# Patient Record
Sex: MALE | Race: WHITE | Employment: FULL TIME | URBAN - METROPOLITAN AREA
[De-identification: names, ages, dates, MRNs, and addresses within clinical notes are randomized per-mention and may not be internally consistent; named-entity substitution may affect disease eponyms.]

---

## 2023-05-03 ENCOUNTER — HOSPITAL ENCOUNTER (EMERGENCY)
Age: 33
Discharge: HOME | End: 2023-05-04
Payer: COMMERCIAL

## 2023-05-03 ENCOUNTER — APPOINTMENT (OUTPATIENT)
Dept: GENERAL RADIOLOGY | Age: 33
End: 2023-05-03
Payer: COMMERCIAL

## 2023-05-03 DIAGNOSIS — R07.9 CHEST PAIN, UNSPECIFIED TYPE: Primary | ICD-10-CM

## 2023-05-03 LAB
ALBUMIN SERPL-MCNC: 4.9 G/DL (ref 3.4–5)
ALBUMIN/GLOB SERPL: 1.6 {RATIO} (ref 1.1–2.2)
ALP SERPL-CCNC: 49 U/L (ref 40–129)
ALT SERPL-CCNC: 28 U/L (ref 10–40)
ANION GAP SERPL CALCULATED.3IONS-SCNC: 12 MMOL/L (ref 3–16)
AST SERPL-CCNC: 23 U/L (ref 15–37)
BASOPHILS # BLD: 0 K/UL (ref 0–0.2)
BASOPHILS NFR BLD: 0.3 %
BILIRUB SERPL-MCNC: 0.5 MG/DL (ref 0–1)
BUN SERPL-MCNC: 15 MG/DL (ref 7–20)
CALCIUM SERPL-MCNC: 9.8 MG/DL (ref 8.3–10.6)
CHLORIDE SERPL-SCNC: 98 MMOL/L (ref 99–110)
CO2 SERPL-SCNC: 28 MMOL/L (ref 21–32)
CREAT SERPL-MCNC: 1 MG/DL (ref 0.9–1.3)
DEPRECATED RDW RBC AUTO: 12.3 % (ref 12.4–15.4)
EOSINOPHIL # BLD: 0 K/UL (ref 0–0.6)
EOSINOPHIL NFR BLD: 0.4 %
GFR SERPLBLD CREATININE-BSD FMLA CKD-EPI: >60 ML/MIN/{1.73_M2}
GLUCOSE SERPL-MCNC: 106 MG/DL (ref 70–99)
HCT VFR BLD AUTO: 43.3 % (ref 40.5–52.5)
HGB BLD-MCNC: 15.3 G/DL (ref 13.5–17.5)
LYMPHOCYTES # BLD: 2 K/UL (ref 1–5.1)
LYMPHOCYTES NFR BLD: 32.6 %
MCH RBC QN AUTO: 31.6 PG (ref 26–34)
MCHC RBC AUTO-ENTMCNC: 35.4 G/DL (ref 31–36)
MCV RBC AUTO: 89.3 FL (ref 80–100)
MONOCYTES # BLD: 0.5 K/UL (ref 0–1.3)
MONOCYTES NFR BLD: 7.5 %
NEUTROPHILS # BLD: 3.6 K/UL (ref 1.7–7.7)
NEUTROPHILS NFR BLD: 59.2 %
PLATELET # BLD AUTO: 247 K/UL (ref 135–450)
PMV BLD AUTO: 8.7 FL (ref 5–10.5)
POTASSIUM SERPL-SCNC: 3.5 MMOL/L (ref 3.5–5.1)
PROT SERPL-MCNC: 7.9 G/DL (ref 6.4–8.2)
RBC # BLD AUTO: 4.84 M/UL (ref 4.2–5.9)
SODIUM SERPL-SCNC: 138 MMOL/L (ref 136–145)
TROPONIN, HIGH SENSITIVITY: 7 NG/L (ref 0–22)
WBC # BLD AUTO: 6.1 K/UL (ref 4–11)

## 2023-05-03 PROCEDURE — 71045 X-RAY EXAM CHEST 1 VIEW: CPT

## 2023-05-03 PROCEDURE — 85025 COMPLETE CBC W/AUTO DIFF WBC: CPT

## 2023-05-03 PROCEDURE — 80053 COMPREHEN METABOLIC PANEL: CPT

## 2023-05-03 PROCEDURE — 99285 EMERGENCY DEPT VISIT HI MDM: CPT

## 2023-05-03 PROCEDURE — 84484 ASSAY OF TROPONIN QUANT: CPT

## 2023-05-03 PROCEDURE — 93005 ELECTROCARDIOGRAM TRACING: CPT | Performed by: EMERGENCY MEDICINE

## 2023-05-03 ASSESSMENT — PAIN - FUNCTIONAL ASSESSMENT: PAIN_FUNCTIONAL_ASSESSMENT: NONE - DENIES PAIN

## 2023-05-04 VITALS
HEIGHT: 71 IN | HEART RATE: 52 BPM | DIASTOLIC BLOOD PRESSURE: 74 MMHG | BODY MASS INDEX: 25.9 KG/M2 | WEIGHT: 185 LBS | OXYGEN SATURATION: 100 % | RESPIRATION RATE: 18 BRPM | SYSTOLIC BLOOD PRESSURE: 124 MMHG | TEMPERATURE: 98.5 F

## 2023-05-04 LAB
EKG ATRIAL RATE: 65 BPM
EKG DIAGNOSIS: NORMAL
EKG P AXIS: 66 DEGREES
EKG P-R INTERVAL: 146 MS
EKG Q-T INTERVAL: 382 MS
EKG QRS DURATION: 92 MS
EKG QTC CALCULATION (BAZETT): 397 MS
EKG R AXIS: 61 DEGREES
EKG T AXIS: 63 DEGREES
EKG VENTRICULAR RATE: 65 BPM
TROPONIN, HIGH SENSITIVITY: 7 NG/L (ref 0–22)

## 2023-05-04 PROCEDURE — 93010 ELECTROCARDIOGRAM REPORT: CPT | Performed by: INTERNAL MEDICINE

## 2023-05-04 ASSESSMENT — PAIN - FUNCTIONAL ASSESSMENT: PAIN_FUNCTIONAL_ASSESSMENT: NONE - DENIES PAIN

## 2023-05-04 NOTE — ED PROVIDER NOTES
I was asked for an EKG interpretation. Otherwise, I did not evaluate the patient. I was available for consultation. EKG  The Ekg interpreted by me in the absence of a cardiologist shows. normal sinus rhythm with a rate of 65  Axis is   Normal  QTc is  normal  Intervals and Durations are unremarkable. No specific ST-T wave changes appreciated. No evidence of acute ischemia.    No prior EKG available for comparison       Mallory Pina MD  05/04/23 4303
identified. .    Chronic conditions affecting care:   None.   has no past medical history on file. Social Determinants:   None identified    Consults:   None    Reassessment:      Patient continues without significant complaints on reevaluation and vitals have remained stable. MDM/Disposition Considerations:   Briefly Nehemiah Chan presents for episode of chest pain, elevated heart rate and near syncope. CBC was without leukocytosis or anemia. CMP was unremarkable and troponin of 7. Patient was stable portable chest x-ray. EKG normal sinus rhythm without evidence for acute ischemic change. Repeat troponin remains less than 7. At this time patient with low risk factors for cardiac etiology. Will be discharged home with recommendations for close and continued outpatient PCP and cardiology follow-up. We have discussed return precautions otherwise and patient in agreement and comfortable at discharge. .    Critical Care Time:   0 Minutes of critical care time spent not including separately billable procedures. DDx:  I estimate there is LOW risk for ACUTE CORONARY SYNDROME, INTRACRANIAL HEMORRHAGE, MALIGNANT DYSRHYTHMIA or HYPERTENSION, PULMONARY EMBOLISM, SEPSIS, SUBARACHNOID HEMORRHAGE, SUBDURAL HEMATOMA, STROKE, or THORACIC AORTIC DISSECTION, thus I consider the discharge disposition reasonable. Nancy Huerta and I have also discussed returning to the Emergency Department immediately if new or worsening symptoms occur. We have discussed the symptoms which are most concerning (e.g., bloody sputum, fever, worsening pain or shortness of breath, vomiting, weakness) that necessitate immediate return. FINAL IMPRESSION  1. Chest pain, unspecified type      Patient referred to:   [facility] [id number]  [phone]    Schedule an appointment as soon as possible for a visit       [address]

## 2024-01-04 ENCOUNTER — OFFICE VISIT (OUTPATIENT)
Dept: URGENT CARE | Age: 34
End: 2024-01-04

## 2024-01-04 VITALS
HEART RATE: 69 BPM | BODY MASS INDEX: 25.8 KG/M2 | WEIGHT: 185 LBS | DIASTOLIC BLOOD PRESSURE: 80 MMHG | SYSTOLIC BLOOD PRESSURE: 124 MMHG | OXYGEN SATURATION: 98 % | TEMPERATURE: 98.6 F

## 2024-01-04 DIAGNOSIS — J06.9 VIRAL URI WITH COUGH: ICD-10-CM

## 2024-01-04 DIAGNOSIS — R09.81 CONGESTION OF NASAL SINUS: Primary | ICD-10-CM

## 2024-01-04 DIAGNOSIS — R05.9 COUGH, UNSPECIFIED TYPE: ICD-10-CM

## 2024-01-04 LAB
INFLUENZA VIRUS A RNA: NEGATIVE
INFLUENZA VIRUS B RNA: NEGATIVE
Lab: NORMAL
QC PASS/FAIL: NORMAL
SARS-COV-2 RDRP RESP QL NAA+PROBE: NEGATIVE

## 2024-01-04 RX ORDER — FLUTICASONE PROPIONATE 50 MCG
2 SPRAY, SUSPENSION (ML) NASAL DAILY
Qty: 48 G | Refills: 1 | Status: SHIPPED | OUTPATIENT
Start: 2024-01-04

## 2024-01-04 RX ORDER — BROMPHENIRAMINE MALEATE, PSEUDOEPHEDRINE HYDROCHLORIDE, AND DEXTROMETHORPHAN HYDROBROMIDE 2; 30; 10 MG/5ML; MG/5ML; MG/5ML
5 SYRUP ORAL 4 TIMES DAILY PRN
Qty: 120 ML | Refills: 0 | Status: SHIPPED | OUTPATIENT
Start: 2024-01-04

## 2024-01-04 ASSESSMENT — ENCOUNTER SYMPTOMS
SHORTNESS OF BREATH: 0
SINUS PRESSURE: 0
EYE DISCHARGE: 0
COUGH: 1
EYE PAIN: 0
EYE REDNESS: 0
ABDOMINAL PAIN: 0
DIARRHEA: 0
VOMITING: 0
SORE THROAT: 0
NAUSEA: 0

## 2024-01-04 NOTE — PATIENT INSTRUCTIONS
- Pt to drink lots of fluids  - Pt to take medication as prescribed  - Pt ok to take tylenol and ibuprofen as needed  - Pt to call if any symptoms worsen or follow up with PCP  - Pt to go to ER if have shortness of breath or chest pain  - pt to isolate until fever free for 24 hours without fever reducers

## 2024-01-04 NOTE — PROGRESS NOTES
Shady Huerta (: 1990) is a 33 y.o. male, New patient, here for evaluation of the following chief complaint(s):  Congestion (Pt complains of congestion, productive cough, fever, chills for 4 days. )      ASSESSMENT/PLAN:    ICD-10-CM    1. Congestion of nasal sinus  R09.81 POCT Influenza A/B DNA (Alere i)     POCT COVID-19 Rapid, NAAT      2. Cough, unspecified type  R05.9 POCT Influenza A/B DNA (Alere i)     POCT COVID-19 Rapid, NAAT      3. Viral URI with cough  J06.9 brompheniramine-pseudoephedrine-DM (BROMFED DM) 2-30-10 MG/5ML syrup     fluticasone (FLONASE) 50 MCG/ACT nasal spray          - Pt wanted to have a rapid covid and flu test. Pt is negative for covid, flu A and flu B. Low concern for strep pharyngitis, otitis media, bacterial pneumonia, bronchitis, sinusitis or sepsis.  - Pt to drink lots of fluids  - Pt to take medication as prescribed  - Pt ok to take tylenol and ibuprofen as needed  - Pt to call if any symptoms worsen or follow up with PCP  - Pt to go to ER if have shortness of breath or chest pain  - pt to isolate until fever free for 24 hours without fever reducers      Follow up with your PCP or return to the clinic in 5 days if symptoms persist or if symptoms worsen.    Reviewed AVS with treatment instructions and answered questions - pt expresses understanding and agreement with the discussed treatment plan and AVS instructions.      SUBJECTIVE/OBJECTIVE:  HPI:   33 y.o. male presents for complaint of 4 days ago he started with fever, chills and sweats.    Also admits symptoms of nasal congestion, cough and headache.  Denies symptoms of sore throat, ear pain, abdominal pain, vomiting or diarrhea.     Has taken dayquil and niquil at home. No known sick contacts.      History provided by:  Patient   used: No        VITAL SIGNS  Vitals:    24 1547   BP: 124/80   Pulse: 69   Temp: 98.6 °F (37 °C)   SpO2: 98%   Weight: 83.9 kg (185 lb)       Review of

## 2024-01-08 ENCOUNTER — OFFICE VISIT (OUTPATIENT)
Dept: URGENT CARE | Age: 34
End: 2024-01-08

## 2024-01-08 VITALS
BODY MASS INDEX: 25.06 KG/M2 | HEIGHT: 72 IN | TEMPERATURE: 98 F | WEIGHT: 185 LBS | HEART RATE: 73 BPM | SYSTOLIC BLOOD PRESSURE: 120 MMHG | OXYGEN SATURATION: 99 % | DIASTOLIC BLOOD PRESSURE: 76 MMHG

## 2024-01-08 DIAGNOSIS — J40 BRONCHITIS: Primary | ICD-10-CM

## 2024-01-08 RX ORDER — BENZONATATE 100 MG/1
100 CAPSULE ORAL 3 TIMES DAILY PRN
Qty: 30 CAPSULE | Refills: 0 | Status: SHIPPED | OUTPATIENT
Start: 2024-01-08 | End: 2024-01-18

## 2024-01-08 RX ORDER — METHYLPREDNISOLONE 4 MG/1
TABLET ORAL
Qty: 1 KIT | Refills: 0 | Status: SHIPPED | OUTPATIENT
Start: 2024-01-08 | End: 2024-01-14

## 2024-01-08 ASSESSMENT — ENCOUNTER SYMPTOMS
DIARRHEA: 0
VOMITING: 0
ABDOMINAL PAIN: 0
EYE DISCHARGE: 0
SINUS PRESSURE: 0
EYE PAIN: 0
SHORTNESS OF BREATH: 0
EYE REDNESS: 0
SORE THROAT: 0
COUGH: 1
NAUSEA: 0

## 2024-01-08 NOTE — PROGRESS NOTES
Shady Huerta (: 1990) is a 34 y.o. male, Established patient, here for evaluation of the following chief complaint(s):  Cough (Unresolved, seen here on )      ASSESSMENT/PLAN:    ICD-10-CM    1. Bronchitis  J40 methylPREDNISolone (MEDROL DOSEPACK) 4 MG tablet     benzonatate (TESSALON) 100 MG capsule          - Pt did not want any testing done. Low concern for strep pharyngitis, otitis media, bacterial pneumonia, sinusitis or sepsis.  - Pt to drink lots of fluids  - Pt to take medication as prescribed  - Pt ok to take tylenol as needed  - Pt to call if any symptoms worsen or follow up with PCP  - Pt to go to ER if have shortness of breath or chest pain      Follow up with your PCP or return to the clinic in 5 days if symptoms persist or if symptoms worsen.    Reviewed AVS with treatment instructions and answered questions - pt expresses understanding and agreement with the discussed treatment plan and AVS instructions.      SUBJECTIVE/OBJECTIVE:  HPI:   34 y.o. male presents for complaint of pt was seen here 4 days ago and was diagnosed with a viral URI and given flonase and cough meds and he had a flu and covid tests that were negative. Pt states he comes back in today because his cough has seemed to be getting worse.    Also admits symptoms of nasal congestion and cough.  Denies symptoms of fevers, body aches, chills, sweats, ear pain, sore throat, abdominal pain, vomiting or diarrhea.     Has taken the medication that was prescribed and it has not seemed to help. No known sick contacts.      History provided by:  Patient   used: No        VITAL SIGNS  Vitals:    24 0917   BP: 120/76   Pulse: 73   Temp: 98 °F (36.7 °C)   TempSrc: Oral   SpO2: 99%   Weight: 83.9 kg (185 lb)   Height: 1.829 m (6')       Review of Systems   Constitutional:  Positive for fatigue. Negative for chills and fever.   HENT:  Positive for congestion. Negative for ear discharge, ear pain,

## 2024-01-08 NOTE — PATIENT INSTRUCTIONS
- Pt to drink lots of fluids  - Pt to take medication as prescribed  - Pt ok to take tylenol as needed  - Pt to call if any symptoms worsen or follow up with PCP  - Pt to go to ER if have shortness of breath or chest pain

## 2024-01-29 ENCOUNTER — TELEPHONE (OUTPATIENT)
Dept: ORTHOPEDIC SURGERY | Age: 34
End: 2024-01-29

## 2024-02-01 ENCOUNTER — OFFICE VISIT (OUTPATIENT)
Dept: ORTHOPEDIC SURGERY | Age: 34
End: 2024-02-01
Payer: COMMERCIAL

## 2024-02-01 VITALS — WEIGHT: 184.97 LBS | HEIGHT: 72 IN | BODY MASS INDEX: 25.05 KG/M2

## 2024-02-01 DIAGNOSIS — M47.812 CERVICAL SPONDYLOSIS: ICD-10-CM

## 2024-02-01 DIAGNOSIS — M54.2 CERVICAL PAIN: Primary | ICD-10-CM

## 2024-02-01 PROCEDURE — 99203 OFFICE O/P NEW LOW 30 MIN: CPT | Performed by: ORTHOPAEDIC SURGERY

## 2024-02-01 NOTE — PROGRESS NOTES
New Patient: CERVICAL SPINE    Referring Provider:  No ref. provider found    CHIEF COMPLAINT:    Chief Complaint   Patient presents with    Neck Pain     NP Cervical       HISTORY OF PRESENT ILLNESS:   Mr. Shady Huerta is a pleasant 34 y.o. old male presents today for evaluation of neck pain that began after lifting about 5 years ago.  He rates his pain 8/10.  He has 7/10 low back pain.. He denies loss of fine motor control, lower extremity symptoms, gait abnormality and bowel or bladder dysfunction.      Current/Past Treatment:   Physical Therapy: Yes and continues to do exercises  Chiropractic: No  Injection: Cervical JEFF and RF both helpful  Medications: none      Past Medical History:   I reviewed past medical history which is noncontributory to his present illness  Past Surgical History:     Past Surgical History:   Procedure Laterality Date    WISDOM TOOTH EXTRACTION      WRIST SURGERY      3 different surgerys      Current Medications:     Current Outpatient Medications:     brompheniramine-pseudoephedrine-DM (BROMFED DM) 2-30-10 MG/5ML syrup, Take 5 mLs by mouth 4 times daily as needed for Cough, Disp: 120 mL, Rfl: 0    fluticasone (FLONASE) 50 MCG/ACT nasal spray, 2 sprays by Each Nostril route daily, Disp: 48 g, Rfl: 1    Multiple Vitamins-Minerals (THERAPEUTIC MULTIVITAMIN-MINERALS) tablet, Take 1 tablet by mouth daily Indications: otc, Disp: , Rfl:   Allergies:  Erythromycin  Social History:    reports that he has quit smoking. He has quit using smokeless tobacco. He reports current alcohol use of about 3.0 standard drinks of alcohol per week. He reports that he does not use drugs.  Family History:   Family History   Problem Relation Age of Onset    High Blood Pressure Mother     High Blood Pressure Father     Heart Disease Father        REVIEW OF SYSTEMS: Full ROS noted & scanned   CONSTITUTIONAL: Denies unexplained weight loss, fevers, chills or fatigue  NEUROLOGICAL: Denies unsteady gait

## 2024-02-09 ENCOUNTER — OFFICE VISIT (OUTPATIENT)
Dept: FAMILY MEDICINE CLINIC | Age: 34
End: 2024-02-09
Payer: COMMERCIAL

## 2024-02-09 VITALS
RESPIRATION RATE: 16 BRPM | HEIGHT: 72 IN | DIASTOLIC BLOOD PRESSURE: 70 MMHG | SYSTOLIC BLOOD PRESSURE: 108 MMHG | BODY MASS INDEX: 25.19 KG/M2 | OXYGEN SATURATION: 97 % | HEART RATE: 58 BPM | WEIGHT: 186 LBS

## 2024-02-09 DIAGNOSIS — Z13.1 ENCOUNTER FOR SCREENING EXAMINATION FOR IMPAIRED GLUCOSE REGULATION AND DIABETES MELLITUS: ICD-10-CM

## 2024-02-09 DIAGNOSIS — F41.1 GENERALIZED ANXIETY DISORDER: Primary | ICD-10-CM

## 2024-02-09 DIAGNOSIS — J02.9 SORE THROAT: ICD-10-CM

## 2024-02-09 DIAGNOSIS — F10.10 ALCOHOL ABUSE: ICD-10-CM

## 2024-02-09 DIAGNOSIS — J02.0 ACUTE STREPTOCOCCAL PHARYNGITIS: ICD-10-CM

## 2024-02-09 PROBLEM — H91.90 PROBLEMS WITH HEARING: Status: ACTIVE | Noted: 2024-02-09

## 2024-02-09 LAB
ALBUMIN SERPL-MCNC: 5.1 G/DL (ref 3.4–5)
ALBUMIN/GLOB SERPL: 2.6 {RATIO} (ref 1.1–2.2)
ALP SERPL-CCNC: 57 U/L (ref 40–129)
ALT SERPL-CCNC: 19 U/L (ref 10–40)
ANION GAP SERPL CALCULATED.3IONS-SCNC: 11 MMOL/L (ref 3–16)
AST SERPL-CCNC: 21 U/L (ref 15–37)
BASOPHILS # BLD: 0 K/UL (ref 0–0.2)
BASOPHILS NFR BLD: 0.5 %
BILIRUB SERPL-MCNC: 0.4 MG/DL (ref 0–1)
BUN SERPL-MCNC: 23 MG/DL (ref 7–20)
CALCIUM SERPL-MCNC: 9.5 MG/DL (ref 8.3–10.6)
CHLORIDE SERPL-SCNC: 96 MMOL/L (ref 99–110)
CO2 SERPL-SCNC: 30 MMOL/L (ref 21–32)
CREAT SERPL-MCNC: 1.5 MG/DL (ref 0.9–1.3)
DEPRECATED RDW RBC AUTO: 12.3 % (ref 12.4–15.4)
EOSINOPHIL # BLD: 0 K/UL (ref 0–0.6)
EOSINOPHIL NFR BLD: 0.9 %
GFR SERPLBLD CREATININE-BSD FMLA CKD-EPI: >60 ML/MIN/{1.73_M2}
GLUCOSE SERPL-MCNC: 87 MG/DL (ref 70–99)
HCT VFR BLD AUTO: 43.5 % (ref 40.5–52.5)
HGB BLD-MCNC: 15.9 G/DL (ref 13.5–17.5)
LYMPHOCYTES # BLD: 1 K/UL (ref 1–5.1)
LYMPHOCYTES NFR BLD: 25 %
MCH RBC QN AUTO: 32.6 PG (ref 26–34)
MCHC RBC AUTO-ENTMCNC: 36.6 G/DL (ref 31–36)
MCV RBC AUTO: 89.1 FL (ref 80–100)
MONOCYTES # BLD: 0.6 K/UL (ref 0–1.3)
MONOCYTES NFR BLD: 14.1 %
NEUTROPHILS # BLD: 2.5 K/UL (ref 1.7–7.7)
NEUTROPHILS NFR BLD: 59.5 %
PLATELET # BLD AUTO: 175 K/UL (ref 135–450)
PMV BLD AUTO: 10.3 FL (ref 5–10.5)
POTASSIUM SERPL-SCNC: 4.7 MMOL/L (ref 3.5–5.1)
PROT SERPL-MCNC: 7.1 G/DL (ref 6.4–8.2)
RBC # BLD AUTO: 4.89 M/UL (ref 4.2–5.9)
S PYO AG THROAT QL: POSITIVE
SODIUM SERPL-SCNC: 137 MMOL/L (ref 136–145)
TSH SERPL DL<=0.005 MIU/L-ACNC: 2.65 UIU/ML (ref 0.27–4.2)
WBC # BLD AUTO: 4.2 K/UL (ref 4–11)

## 2024-02-09 PROCEDURE — 36415 COLL VENOUS BLD VENIPUNCTURE: CPT

## 2024-02-09 PROCEDURE — 87880 STREP A ASSAY W/OPTIC: CPT

## 2024-02-09 PROCEDURE — 99204 OFFICE O/P NEW MOD 45 MIN: CPT

## 2024-02-09 RX ORDER — LORAZEPAM 0.5 MG/1
0.5 TABLET ORAL PRN
Qty: 2 TABLET | Refills: 0 | Status: SHIPPED | OUTPATIENT
Start: 2024-02-09 | End: 2024-02-23

## 2024-02-09 RX ORDER — AMOXICILLIN 500 MG/1
500 CAPSULE ORAL 2 TIMES DAILY
Qty: 14 CAPSULE | Refills: 0 | Status: SHIPPED | OUTPATIENT
Start: 2024-02-09 | End: 2024-02-16

## 2024-02-09 SDOH — ECONOMIC STABILITY: HOUSING INSECURITY
IN THE LAST 12 MONTHS, WAS THERE A TIME WHEN YOU DID NOT HAVE A STEADY PLACE TO SLEEP OR SLEPT IN A SHELTER (INCLUDING NOW)?: NO

## 2024-02-09 SDOH — ECONOMIC STABILITY: FOOD INSECURITY: WITHIN THE PAST 12 MONTHS, THE FOOD YOU BOUGHT JUST DIDN'T LAST AND YOU DIDN'T HAVE MONEY TO GET MORE.: NEVER TRUE

## 2024-02-09 SDOH — HEALTH STABILITY: PHYSICAL HEALTH: ON AVERAGE, HOW MANY MINUTES DO YOU ENGAGE IN EXERCISE AT THIS LEVEL?: 50 MIN

## 2024-02-09 SDOH — HEALTH STABILITY: PHYSICAL HEALTH: ON AVERAGE, HOW MANY DAYS PER WEEK DO YOU ENGAGE IN MODERATE TO STRENUOUS EXERCISE (LIKE A BRISK WALK)?: 6 DAYS

## 2024-02-09 SDOH — ECONOMIC STABILITY: FOOD INSECURITY: WITHIN THE PAST 12 MONTHS, YOU WORRIED THAT YOUR FOOD WOULD RUN OUT BEFORE YOU GOT MONEY TO BUY MORE.: NEVER TRUE

## 2024-02-09 SDOH — ECONOMIC STABILITY: INCOME INSECURITY: HOW HARD IS IT FOR YOU TO PAY FOR THE VERY BASICS LIKE FOOD, HOUSING, MEDICAL CARE, AND HEATING?: NOT HARD AT ALL

## 2024-02-09 ASSESSMENT — PATIENT HEALTH QUESTIONNAIRE - PHQ9
SUM OF ALL RESPONSES TO PHQ QUESTIONS 1-9: 0
3. TROUBLE FALLING OR STAYING ASLEEP: 0
6. FEELING BAD ABOUT YOURSELF - OR THAT YOU ARE A FAILURE OR HAVE LET YOURSELF OR YOUR FAMILY DOWN: 0
4. FEELING TIRED OR HAVING LITTLE ENERGY: 0
SUM OF ALL RESPONSES TO PHQ QUESTIONS 1-9: 0
9. THOUGHTS THAT YOU WOULD BE BETTER OFF DEAD, OR OF HURTING YOURSELF: 0
10. IF YOU CHECKED OFF ANY PROBLEMS, HOW DIFFICULT HAVE THESE PROBLEMS MADE IT FOR YOU TO DO YOUR WORK, TAKE CARE OF THINGS AT HOME, OR GET ALONG WITH OTHER PEOPLE: 0
7. TROUBLE CONCENTRATING ON THINGS, SUCH AS READING THE NEWSPAPER OR WATCHING TELEVISION: 0
2. FEELING DOWN, DEPRESSED OR HOPELESS: 0
1. LITTLE INTEREST OR PLEASURE IN DOING THINGS: 0
SUM OF ALL RESPONSES TO PHQ QUESTIONS 1-9: 0
SUM OF ALL RESPONSES TO PHQ9 QUESTIONS 1 & 2: 0
SUM OF ALL RESPONSES TO PHQ QUESTIONS 1-9: 0
5. POOR APPETITE OR OVEREATING: 0
8. MOVING OR SPEAKING SO SLOWLY THAT OTHER PEOPLE COULD HAVE NOTICED. OR THE OPPOSITE, BEING SO FIGETY OR RESTLESS THAT YOU HAVE BEEN MOVING AROUND A LOT MORE THAN USUAL: 0

## 2024-02-09 ASSESSMENT — ENCOUNTER SYMPTOMS
BLOOD IN STOOL: 0
COLOR CHANGE: 0
WHEEZING: 0
SORE THROAT: 0
ABDOMINAL PAIN: 0
SHORTNESS OF BREATH: 0
CONSTIPATION: 0
COUGH: 0
DIARRHEA: 0

## 2024-02-09 NOTE — PROGRESS NOTES
Shady Huerta (:  1990) is a 34 y.o. male,New patient, here for evaluation of the following chief complaint(s):  Establish Care (Pt is here to establish care, has not had a PCP in a while )         ASSESSMENT/PLAN:  1. Generalized anxiety disorder  -     CBC with Auto Differential; Future  -     Comprehensive Metabolic Panel; Future  -     TSH with Reflex; Future  -     LORazepam (ATIVAN) 0.5 MG tablet; Take 1 tablet by mouth as needed for Anxiety (20 to 30 minutes before flight) for up to 2 doses. Max Daily Amount: 1 mg, Disp-2 tablet, R-0Normal  - Patient has generalized anxiety disorder with flying.  Patient had an episode which he had a panic attack on the plane ultimately the plane had to be diverted and he was taken to an ER had a negative cardiac workup.  However ever since patient has had issues with flying.  Patient is unable to fly with out having serious anxiety.  Plan to use short dose of Ativan since patient has upcoming flight for work that he has to attend.  Patient was educated on benefits of psychology.  Was given referral to psychiatry to work on talk therapy as well as cognitive behavioral therapy to reduce his fear of flying so he would no longer need medication.  He is agreeable and understands plan at this time.  Did review O[facility]RS with concerns educated on medication and potential side effects of medication.  2. Alcohol abuse  -     TSH with Reflex; Future  - Patient previously had fairly moderate alcohol abuse drinking 2 to 3 glasses of bourbon nightly.  Patient has since stopped we will check thyroid level and labs  3. Sore throat  -     POCT rapid strep A  -Positive  4. Acute streptococcal pharyngitis  -     amoxicillin (AMOXIL) 500 MG capsule; Take 1 capsule by mouth 2 times daily for 7 days, Disp-14 capsule, R-0Normal  - Patient had recent strep exposure as daughter tested positive.  In office today patient has tested positive for strep is currently asymptomatic did

## 2024-02-10 ENCOUNTER — HOSPITAL ENCOUNTER (OUTPATIENT)
Dept: MRI IMAGING | Age: 34
Discharge: HOME | End: 2024-02-10
Attending: ORTHOPAEDIC SURGERY
Payer: COMMERCIAL

## 2024-02-10 DIAGNOSIS — M54.2 CERVICAL PAIN: ICD-10-CM

## 2024-02-10 DIAGNOSIS — M47.812 CERVICAL SPONDYLOSIS: ICD-10-CM

## 2024-02-10 LAB
EST. AVERAGE GLUCOSE BLD GHB EST-MCNC: 85.3 MG/DL
HBA1C MFR BLD: 4.6 %

## 2024-02-10 PROCEDURE — 72141 MRI NECK SPINE W/O DYE: CPT

## 2024-02-15 ENCOUNTER — OFFICE VISIT (OUTPATIENT)
Dept: ORTHOPEDIC SURGERY | Age: 34
End: 2024-02-15
Payer: COMMERCIAL

## 2024-02-15 VITALS — WEIGHT: 186.07 LBS | HEIGHT: 72 IN | BODY MASS INDEX: 25.2 KG/M2

## 2024-02-15 DIAGNOSIS — M47.812 CERVICAL SPONDYLOSIS: Primary | ICD-10-CM

## 2024-02-15 PROCEDURE — 99212 OFFICE O/P EST SF 10 MIN: CPT | Performed by: ORTHOPAEDIC SURGERY

## 2024-02-15 NOTE — PROGRESS NOTES
New Patient: CERVICAL SPINE    Referring Provider:  No ref. provider found    CHIEF COMPLAINT:    Chief Complaint   Patient presents with    Neck Pain     Cervical MRI Review       HISTORY OF PRESENT ILLNESS:   Mr. Shady Huerta returns today with persistent neck pain that began after lifting about 5 years ago.  He rates his pain 8/10.  He has 7/10 low back pain.. He denies loss of fine motor control, lower extremity symptoms, gait abnormality and bowel or bladder dysfunction.      Current/Past Treatment:   Physical Therapy: Yes and continues to do exercises  Chiropractic: No  Injection: Cervical JEFF and RF both helpful  Medications: none      Past Medical History:   I reviewed past medical history which is noncontributory to his present illness  Past Surgical History:     Past Surgical History:   Procedure Laterality Date    WISDOM TOOTH EXTRACTION      WRIST SURGERY      3 different surgerys      Current Medications:     Current Outpatient Medications:     LORazepam (ATIVAN) 0.5 MG tablet, Take 1 tablet by mouth as needed for Anxiety (20 to 30 minutes before flight) for up to 2 doses. Max Daily Amount: 1 mg, Disp: 2 tablet, Rfl: 0    amoxicillin (AMOXIL) 500 MG capsule, Take 1 capsule by mouth 2 times daily for 7 days, Disp: 14 capsule, Rfl: 0  Allergies:  Erythromycin  Social History:    reports that he has quit smoking. He has quit using smokeless tobacco. He reports that he does not currently use alcohol. He reports that he does not use drugs.  Family History:   Family History   Problem Relation Age of Onset    High Blood Pressure Mother     High Blood Pressure Father     Heart Disease Father        REVIEW OF SYSTEMS: Full ROS noted & scanned   CONSTITUTIONAL: Denies unexplained weight loss, fevers, chills or fatigue  NEUROLOGICAL: Denies unsteady gait or progressive weakness  MUSCULOSKELETAL: Denies joint swelling or redness  PSYCHOLOGICAL: Denies anxiety, depression   SKIN: Denies skin changes,

## 2024-02-26 DIAGNOSIS — M47.812 CERVICAL SPONDYLOSIS: Primary | ICD-10-CM

## 2024-02-26 DIAGNOSIS — M54.2 CERVICAL PAIN: ICD-10-CM

## 2024-04-09 DIAGNOSIS — F41.1 GENERALIZED ANXIETY DISORDER: Primary | ICD-10-CM

## 2024-04-09 RX ORDER — LORAZEPAM 0.5 MG/1
0.5 TABLET ORAL PRN
Qty: 4 TABLET | Refills: 0 | Status: SHIPPED | OUTPATIENT
Start: 2024-04-09 | End: 2024-05-09

## 2024-07-22 ENCOUNTER — OFFICE VISIT (OUTPATIENT)
Dept: ORTHOPEDIC SURGERY | Age: 34
End: 2024-07-22
Payer: COMMERCIAL

## 2024-07-22 VITALS — WEIGHT: 170 LBS | BODY MASS INDEX: 23.03 KG/M2 | HEIGHT: 72 IN

## 2024-07-22 DIAGNOSIS — M75.81 ROTATOR CUFF TENDONITIS, RIGHT: ICD-10-CM

## 2024-07-22 DIAGNOSIS — M25.511 RIGHT SHOULDER PAIN, UNSPECIFIED CHRONICITY: ICD-10-CM

## 2024-07-22 DIAGNOSIS — M75.51 SUBACROMIAL BURSITIS OF RIGHT SHOULDER JOINT: Primary | ICD-10-CM

## 2024-07-22 PROCEDURE — 20610 DRAIN/INJ JOINT/BURSA W/O US: CPT | Performed by: ORTHOPAEDIC SURGERY

## 2024-07-22 PROCEDURE — 99204 OFFICE O/P NEW MOD 45 MIN: CPT | Performed by: ORTHOPAEDIC SURGERY

## 2024-07-22 RX ORDER — ROPIVACAINE HYDROCHLORIDE 5 MG/ML
30 INJECTION, SOLUTION EPIDURAL; INFILTRATION; PERINEURAL ONCE
Status: COMPLETED | OUTPATIENT
Start: 2024-07-22 | End: 2024-07-22

## 2024-07-22 RX ORDER — TRIAMCINOLONE ACETONIDE 40 MG/ML
80 INJECTION, SUSPENSION INTRA-ARTICULAR; INTRAMUSCULAR ONCE
Status: COMPLETED | OUTPATIENT
Start: 2024-07-22 | End: 2024-07-22

## 2024-07-22 RX ORDER — MELOXICAM 15 MG/1
15 TABLET ORAL DAILY PRN
Qty: 30 TABLET | Refills: 0 | Status: SHIPPED | OUTPATIENT
Start: 2024-07-22

## 2024-07-22 RX ADMIN — TRIAMCINOLONE ACETONIDE 80 MG: 40 INJECTION, SUSPENSION INTRA-ARTICULAR; INTRAMUSCULAR at 09:39

## 2024-07-22 RX ADMIN — ROPIVACAINE HYDROCHLORIDE 30 ML: 5 INJECTION, SOLUTION EPIDURAL; INFILTRATION; PERINEURAL at 09:38

## 2024-07-22 NOTE — PROGRESS NOTES
ORTHOPAEDIC SURGERY H&P / CONSULTATION NOTE    Chief complaint:   Chief Complaint   Patient presents with    Shoulder Pain     NP R SHOULDER      History of present illness: The patient is a 34 y.o. male right hand dominant with subjective symptoms of right shoulder pain. The chief complaint is located at right shoulder lateral aspect/posterior lateral aspect. Duration of symptoms has been for on again off again for years-3-5. The severity of symptoms is rated at 3/10 pain but can be as high as 8/10 pain on intake form.  Patient denies trauma.  He states that sharp pain with abduction and can also have difficulty with weight lifting.  He denies it waking him up at night per se.  He does state pain with ADLs and was quite bothersome last week.  He denies consistent anti-inflammatories.  He sees pain management for cervical related neck pain and had undergone an ablation within the last year which she states has helped.  He denies injections otherwise around the shoulder.  He denies therapy    The patient has tried the below listed items prior to today's consultation for above listed chief complaint.     -   Over-the-counter anti-inflammatories/prescription medication anti-inflammatory.     -   Physical therapy / guided home exercise program -     -   Previous corticosteroid injections    Past medical history:    Past Medical History:   Diagnosis Date    Anxiety 2021    Had a massive anxiety attack on a plane in May 2023 where i had to be given oxygen and went to the ER upon landing    Headache 2021    Work in front of computer all day.        Past surgical history:    Past Surgical History:   Procedure Laterality Date    WISDOM TOOTH EXTRACTION      WRIST SURGERY      3 different surgerys         Allergies:    Allergies   Allergen Reactions    Erythromycin          Medications:   Current Outpatient Medications:     meloxicam (MOBIC) 15 MG tablet, Take 1 tablet by mouth daily as needed for Pain Take 1 tablet by mouth

## 2024-11-14 ENCOUNTER — OFFICE VISIT (OUTPATIENT)
Dept: ORTHOPEDIC SURGERY | Age: 34
End: 2024-11-14
Payer: COMMERCIAL

## 2024-11-14 VITALS — HEIGHT: 72 IN | WEIGHT: 169.97 LBS | BODY MASS INDEX: 23.02 KG/M2

## 2024-11-14 DIAGNOSIS — M47.812 CERVICAL SPONDYLOSIS: Primary | ICD-10-CM

## 2024-11-14 PROCEDURE — 99213 OFFICE O/P EST LOW 20 MIN: CPT | Performed by: ORTHOPAEDIC SURGERY

## 2024-11-14 NOTE — PROGRESS NOTES
New Patient: CERVICAL SPINE    Referring Provider:  No ref. provider found    CHIEF COMPLAINT:    Chief Complaint   Patient presents with    Follow-up     Ck Cervical       HISTORY OF PRESENT ILLNESS:   Mr. Shady Huerta returns today with persistent neck pain that began after lifting about 5 years ago.  He rates his pain 8/10.  He has 7/10 low back pain.. He denies loss of fine motor control, lower extremity symptoms, gait abnormality and bowel or bladder dysfunction.  Recent RF in the cervical spine was not helpful, but a injection in the right shoulder was    Current/Past Treatment:   Physical Therapy: Yes and continues to do exercises  Chiropractic: No  Injection: Cervical JEFF and RF   Medications: none      Past Medical History:   I reviewed past medical history which is noncontributory to his present illness  Past Surgical History:     Past Surgical History:   Procedure Laterality Date    WISDOM TOOTH EXTRACTION      WRIST SURGERY      3 different surgerys      Current Medications:     Current Outpatient Medications:     meloxicam (MOBIC) 15 MG tablet, Take 1 tablet by mouth daily as needed for Pain Take 1 tablet by mouth daily as needed for pain, Disp: 30 tablet, Rfl: 0  Allergies:  Erythromycin  Social History:    reports that he has quit smoking. He has quit using smokeless tobacco. He reports that he does not currently use alcohol. He reports that he does not use drugs.  Family History:   Family History   Problem Relation Age of Onset    High Blood Pressure Mother     High Blood Pressure Father     Heart Disease Father        REVIEW OF SYSTEMS: Full ROS noted & scanned   CONSTITUTIONAL: Denies unexplained weight loss, fevers, chills or fatigue  NEUROLOGICAL: Denies unsteady gait or progressive weakness  MUSCULOSKELETAL: Denies joint swelling or redness  PSYCHOLOGICAL: Denies anxiety, depression   SKIN: Denies skin changes, delayed healing, rash, itching   HEMATOLOGIC: Denies easy bleeding or

## 2025-01-02 ENCOUNTER — HOSPITAL ENCOUNTER (OUTPATIENT)
Dept: PHYSICAL THERAPY | Age: 35
Setting detail: THERAPIES SERIES
Discharge: HOME | End: 2025-01-02
Payer: COMMERCIAL

## 2025-01-02 DIAGNOSIS — G89.29 CHRONIC RIGHT SHOULDER PAIN: ICD-10-CM

## 2025-01-02 DIAGNOSIS — M25.511 CHRONIC RIGHT SHOULDER PAIN: ICD-10-CM

## 2025-01-02 DIAGNOSIS — R29.898 WEAKNESS OF RIGHT SHOULDER: ICD-10-CM

## 2025-01-02 DIAGNOSIS — M54.2 CERVICAL PAIN: Primary | ICD-10-CM

## 2025-01-02 PROCEDURE — 97110 THERAPEUTIC EXERCISES: CPT

## 2025-01-02 PROCEDURE — 97161 PT EVAL LOW COMPLEX 20 MIN: CPT

## 2025-01-02 PROCEDURE — 20560 NDL INSJ W/O NJX 1 OR 2 MUSC: CPT

## 2025-01-02 PROCEDURE — 97140 MANUAL THERAPY 1/> REGIONS: CPT

## 2025-01-02 NOTE — PLAN OF CARE
nathaniel reddy in.   [] Progressing: [] Met: [] Not Met: [] Adjusted  3. Patient will demonstrate increased Strength of RTC/delt to at least 4+/5 throughout without pain to allow for proper functional mobility to enable patient to return to lifting routine at gym.   [] Progressing: [] Met: [] Not Met: [] Adjusted  4. Patient will return to full sand volleyball participation without increased symptoms or restriction to enable patient to resume PLOF and active lifestyle.   [] Progressing: [] Met: [] Not Met: [] Adjusted      Overall Progression Towards Functional goals/ Treatment Progress Update:  [] Patient is progressing as expected towards functional goals listed.    [] Progression is slowed due to complexities/Impairments listed.  [] Progression has been slowed due to co-morbidities.  [x] Plan just implemented, too soon (<30days) to assess goals progression   [] Goals require adjustment due to lack of progress  [] Patient is not progressing as expected and requires additional follow up with physician  [] Other:     TREATMENT PLAN     Frequency/Duration: 1-2x/week for  12  weeks for the following treatment interventions:    Interventions:  [x] Therapeutic exercise including: strength training, ROM, including postural re-education.   [x] NMR activation and proprioception, including postural re-education.    [x] Manual therapy as indicated to include: PROM, Gr I-IV mobilizations, STM, Grade V Manipulation, and Dry Needling/IASTM  [x] Modalities as needed that may include: Electrical Stimulation  [x] Patient education on joint protection, postural re-education, activity modification, progression of HEP.         Plan: POC initiated as per evaluation    Electronically Signed by Grover Ruiz PT  Date: 01/02/2025     Note: Portions of this note have been templated and/or copied from initial evaluation, reassessments and prior notes for documentation efficiency.    Note: If patient does not return for scheduled/recommended 
16

## 2025-01-03 ENCOUNTER — HOSPITAL ENCOUNTER (OUTPATIENT)
Dept: PHYSICAL THERAPY | Age: 35
Setting detail: THERAPIES SERIES
Discharge: HOME | End: 2025-01-03
Payer: COMMERCIAL

## 2025-01-03 ENCOUNTER — OFFICE VISIT (OUTPATIENT)
Dept: FAMILY MEDICINE CLINIC | Age: 35
End: 2025-01-03
Payer: COMMERCIAL

## 2025-01-03 VITALS
SYSTOLIC BLOOD PRESSURE: 114 MMHG | DIASTOLIC BLOOD PRESSURE: 68 MMHG | HEART RATE: 48 BPM | WEIGHT: 185 LBS | HEIGHT: 72 IN | OXYGEN SATURATION: 99 % | BODY MASS INDEX: 25.06 KG/M2

## 2025-01-03 DIAGNOSIS — M25.572 ACUTE LEFT ANKLE PAIN: ICD-10-CM

## 2025-01-03 DIAGNOSIS — F41.1 GENERALIZED ANXIETY DISORDER: Primary | ICD-10-CM

## 2025-01-03 DIAGNOSIS — Z76.89 ENCOUNTER TO ESTABLISH CARE: ICD-10-CM

## 2025-01-03 DIAGNOSIS — R53.1 GENERALIZED WEAKNESS: ICD-10-CM

## 2025-01-03 DIAGNOSIS — M47.812 CERVICAL SPONDYLOSIS: ICD-10-CM

## 2025-01-03 DIAGNOSIS — R00.1 BRADYCARDIA: ICD-10-CM

## 2025-01-03 DIAGNOSIS — M25.562 ACUTE PAIN OF LEFT KNEE: Primary | ICD-10-CM

## 2025-01-03 PROCEDURE — 97140 MANUAL THERAPY 1/> REGIONS: CPT

## 2025-01-03 PROCEDURE — 97110 THERAPEUTIC EXERCISES: CPT

## 2025-01-03 PROCEDURE — 99213 OFFICE O/P EST LOW 20 MIN: CPT | Performed by: NURSE PRACTITIONER

## 2025-01-03 PROCEDURE — 97161 PT EVAL LOW COMPLEX 20 MIN: CPT

## 2025-01-03 SDOH — HEALTH STABILITY: PHYSICAL HEALTH: ON AVERAGE, HOW MANY DAYS PER WEEK DO YOU ENGAGE IN MODERATE TO STRENUOUS EXERCISE (LIKE A BRISK WALK)?: 4 DAYS

## 2025-01-03 SDOH — HEALTH STABILITY: PHYSICAL HEALTH: ON AVERAGE, HOW MANY MINUTES DO YOU ENGAGE IN EXERCISE AT THIS LEVEL?: 60 MIN

## 2025-01-03 ASSESSMENT — PATIENT HEALTH QUESTIONNAIRE - PHQ9
SUM OF ALL RESPONSES TO PHQ QUESTIONS 1-9: 0
SUM OF ALL RESPONSES TO PHQ QUESTIONS 1-9: 0
SUM OF ALL RESPONSES TO PHQ9 QUESTIONS 1 & 2: 0
SUM OF ALL RESPONSES TO PHQ QUESTIONS 1-9: 0
2. FEELING DOWN, DEPRESSED OR HOPELESS: NOT AT ALL
SUM OF ALL RESPONSES TO PHQ QUESTIONS 1-9: 0
1. LITTLE INTEREST OR PLEASURE IN DOING THINGS: NOT AT ALL

## 2025-01-03 ASSESSMENT — ENCOUNTER SYMPTOMS
CONSTIPATION: 0
DIARRHEA: 0
VOMITING: 0
COLOR CHANGE: 0
BLOOD IN STOOL: 0
SHORTNESS OF BREATH: 0
NAUSEA: 0

## 2025-01-03 NOTE — ASSESSMENT & PLAN NOTE
Discussed PRN use of medication prior to flying  Limiting any etoh and caffeine prior to flying   Maintaining hydration prior to and during all flights  Pt will notify office of need for refills as needed for upcoming flights

## 2025-01-03 NOTE — PROGRESS NOTES
Gastrointestinal:  Negative for blood in stool, constipation, diarrhea, nausea and vomiting.   Musculoskeletal:  Positive for arthralgias.   Skin:  Negative for color change.   Neurological:  Positive for numbness.        Prior injury and surgical intervention left hand resulting in numbness in between fingers full rom strength strong and equal bilaterally        OBJECTIVE:  Vitals:    01/03/25 0822   BP: 114/68   Site: Left Upper Arm   Position: Sitting   Pulse: (!) 48   SpO2: 99%   Weight: 83.9 kg (185 lb)   Height: 1.829 m (6')      Body mass index is 25.09 kg/m².   Physical Exam  Vitals and nursing note reviewed.   Constitutional:       Appearance: Normal appearance. He is normal weight.   HENT:      Head: Normocephalic and atraumatic.      Right Ear: Tympanic membrane, ear canal and external ear normal.      Left Ear: Tympanic membrane, ear canal and external ear normal.      Nose: Nose normal.      Mouth/Throat:      Mouth: Mucous membranes are moist.      Pharynx: Oropharynx is clear.   Cardiovascular:      Rate and Rhythm: Regular rhythm. Bradycardia present.      Pulses: Normal pulses.   Pulmonary:      Effort: Pulmonary effort is normal.      Breath sounds: Normal breath sounds.   Musculoskeletal:         General: Tenderness present. Normal range of motion.      Right shoulder: Tenderness present.      Cervical back: Normal range of motion and neck supple.      Left knee: Tenderness present.      Comments: Followed by ortho ? Impingement right shoulder  Tenderness left calf achilles and left MCL has appt with ortho for evaluation    Skin:     General: Skin is warm and dry.   Neurological:      General: No focal deficit present.      Mental Status: He is alert and oriented to person, place, and time. Mental status is at baseline.   Psychiatric:         Mood and Affect: Mood normal.         Behavior: Behavior normal.         Thought Content: Thought content normal.         Judgment: Judgment normal.

## 2025-01-03 NOTE — ASSESSMENT & PLAN NOTE
Labs will be drawn later when pt has not recently exercised   We will discuss labs in mychart and any recommendations when they are back  Continue relationship with ortho for evaluation of shoulder and leg concerns

## 2025-01-03 NOTE — PLAN OF CARE
[facility]- [facility] [address], [place] [address] office: [phone] fax: [phone]    Physical Therapy Initial Evaluation Certification      Dear Pietro Link DO,    We had the pleasure of evaluating the following patient for physical therapy services at [facility].  A summary of our findings can be found in the initial assessment below.  This includes our plan of care.  If you have any questions or concerns regarding these findings, please do not hesitate to contact me at the office phone number listed above.  Thank you for the referral.     Physician Signature:_______________________________Date:__________________  By signing above (or electronic signature), therapist’s plan is approved by physician       Physical Therapy: TREATMENT/PROGRESS NOTE   Patient: Shady Huerta (34 y.o. male)   Examination Date: 2025   :  1990 MRN: [id number]   Visit #: 1   Insurance Allowable Auth Needed   BCBS check auth []Yes    []No    Insurance: Payor: [place] BCBS / Plan: [place] H[place] / Product Type: *No Product type* /   Insurance ID: [id number] - ([place] [employer])  Secondary Insurance (if applicable):    Treatment Diagnosis:     ICD-10-CM    1. Cervical pain  M54.2       2. Chronic right shoulder pain  M25.511     G89.29       3. Weakness of right shoulder  R29.898          Medical Diagnosis:  Cervical spondylosis [M47.812]   Referring Physician: Pietro Link DO  PCP: No primary care provider on file.     Plan of care signed (Y/N):     Date of Patient follow up with Physician:      Progress Report/POC: EVAL today  Progress note due: 2025 (OR 10 visits /OR AUTH LIMITS, whichever is less)  POC update due: 2025                                              Medical History:  Comorbidities:  None  Relevant Medical History: bulging disc in neck

## 2025-01-03 NOTE — ASSESSMENT & PLAN NOTE
Heart rate in office 54   Discussed hydration limitation of caffeine to less than 200mg daily  If heart rate monitor shows rate less than 40 notify office of seek er help increase hydration

## 2025-01-07 ENCOUNTER — HOSPITAL ENCOUNTER (OUTPATIENT)
Dept: PHYSICAL THERAPY | Age: 35
Setting detail: THERAPIES SERIES
End: 2025-01-07
Payer: COMMERCIAL

## 2025-01-08 ENCOUNTER — HOSPITAL ENCOUNTER (OUTPATIENT)
Dept: PHYSICAL THERAPY | Age: 35
Setting detail: THERAPIES SERIES
Discharge: HOME | End: 2025-01-08
Payer: COMMERCIAL

## 2025-01-08 PROCEDURE — 20560 NDL INSJ W/O NJX 1 OR 2 MUSC: CPT

## 2025-01-08 PROCEDURE — 97140 MANUAL THERAPY 1/> REGIONS: CPT

## 2025-01-08 PROCEDURE — 97110 THERAPEUTIC EXERCISES: CPT

## 2025-01-08 NOTE — FLOWSHEET NOTE
Progressing: [] Met: [] Not Met: [] Adjusted  2. Patient will have a decrease in pain to <0/10 to facilitate improvement in movement, function, and ADLs as indicated by Functional Deficits.  [] Progressing: [] Met: [] Not Met: [] Adjusted    Long Term Goals: To be achieved in: 12 weeks  1. Disability index score of 21% or less for the LEFS to assist with reaching prior level of function with activities such as dressing and hygiene ADLs.  [] Progressing: [] Met: [] Not Met: [] Adjusted  2. Patient will demonstrate increased AROM of left knee/ankle to functional IR/ER within 2 levels of opposite without pain to allow for proper joint functioning to enable patient to tuck shirt in.   [] Progressing: [] Met: [] Not Met: [] Adjusted  3. Patient will demonstrate increased Strength of quad/glutemed to at least 4+/5 throughout without pain to allow for proper functional mobility to enable patient to return to lifting routine at gym.   [] Progressing: [] Met: [] Not Met: [] Adjusted  4. Patient will return to full sand volleyball participation without increased symptoms or restriction to enable patient to resume PLOF and active lifestyle.   [] Progressing: [] Met: [] Not Met: [] Adjusted      Overall Progression Towards Functional goals/ Treatment Progress Update:  [] Patient is progressing as expected towards functional goals listed.    [] Progression is slowed due to complexities/Impairments listed.  [] Progression has been slowed due to co-morbidities.  [x] Plan just implemented, too soon (<30days) to assess goals progression   [] Goals require adjustment due to lack of progress  [] Patient is not progressing as expected and requires additional follow up with physician  [] Other:     TREATMENT PLAN     Frequency/Duration: 1-2x/week for  12  weeks for the following treatment interventions:    Interventions:  [x] Therapeutic exercise including: strength training, ROM, including postural re-education.   [x] NMR activation

## 2025-01-09 ENCOUNTER — HOSPITAL ENCOUNTER (OUTPATIENT)
Dept: PHYSICAL THERAPY | Age: 35
Setting detail: THERAPIES SERIES
Discharge: HOME | End: 2025-01-09
Payer: COMMERCIAL

## 2025-01-09 ENCOUNTER — HOSPITAL ENCOUNTER (OUTPATIENT)
Dept: PHYSICAL THERAPY | Age: 35
Setting detail: THERAPIES SERIES
End: 2025-01-09
Payer: COMMERCIAL

## 2025-01-09 PROCEDURE — 97140 MANUAL THERAPY 1/> REGIONS: CPT

## 2025-01-09 PROCEDURE — 97110 THERAPEUTIC EXERCISES: CPT

## 2025-01-09 PROCEDURE — 20560 NDL INSJ W/O NJX 1 OR 2 MUSC: CPT

## 2025-01-09 NOTE — FLOWSHEET NOTE
[facility]- Outpatient Rehabilitation and Therapy [address], [place] [address] office: [phone] fax: [phone]    Physical Therapy: TREATMENT/PROGRESS NOTE   Patient: Shady Huerta (35 y.o. male)   Examination Date: 2025   :  1990 MRN: [id number]   Visit #: 2   Insurance Allowable Auth Needed   BCBS check auth []Yes    []No    Insurance: Pay[place] BCBS / Plan: [place] / Product Type: *No Product type* /   Insurance ID: [id number] - ([place] BCBS)  Secondary Insurance (if applicable):    Treatment Diagnosis:     ICD-10-CM    1. Cervical pain  M54.2       2. Chronic right shoulder pain  M25.511     G89.29       3. Weakness of right shoulder  R29.898          Medical Diagnosis:  Cervical spondylosis [M47.812]   Referring Physician: Luiz Monzon MD  PCP: Michelle Bocanegra APRN - CNP     Plan of care signed (Y/N):     Date of Patient follow up with Physician:      Progress Report/POC: EVAL today  Progress note due: 2025 (OR 10 visits /OR AUTH LIMITS, whichever is less)  POC update due: 2025                                              Medical History:  Comorbidities:  None  Relevant Medical History: bulging disc in neck                                         Precautions/ Contra-indications:           Latex allergy:  NO  Pacemaker:    NO  Contraindications for Manipulation: None    Preferred Language for Healthcare:   [x] English       [] other:    SUBJECTIVE EXAMINATION     Patient stated complaint: Pt reports sore after last session for a little bit from needling. Still has wrapping around from back of shoulder to front pain.      Neck pain into right shoulder, does not go down arm. Neck pain for 4 years had MRI showing bulging disc, had another last year showing no changes. Has had PT at [place] in the past, feels dry needling helped a lot. Is now having trouble lifting arm up overhead. Plays sand volleyball 1x a week,

## 2025-01-10 ENCOUNTER — LAB (OUTPATIENT)
Dept: FAMILY MEDICINE CLINIC | Age: 35
End: 2025-01-10
Payer: COMMERCIAL

## 2025-01-10 DIAGNOSIS — Z76.89 ENCOUNTER TO ESTABLISH CARE: ICD-10-CM

## 2025-01-10 LAB
ALBUMIN SERPL-MCNC: 4.8 G/DL (ref 3.4–5)
ALBUMIN/GLOB SERPL: 2 {RATIO} (ref 1.1–2.2)
ALP SERPL-CCNC: 46 U/L (ref 40–129)
ALT SERPL-CCNC: 50 U/L (ref 10–40)
ANION GAP SERPL CALCULATED.3IONS-SCNC: 12 MMOL/L (ref 3–16)
AST SERPL-CCNC: 41 U/L (ref 15–37)
BILIRUB SERPL-MCNC: 0.4 MG/DL (ref 0–1)
BUN SERPL-MCNC: 22 MG/DL (ref 7–20)
CALCIUM SERPL-MCNC: 9.8 MG/DL (ref 8.3–10.6)
CHLORIDE SERPL-SCNC: 102 MMOL/L (ref 99–110)
CHOLEST SERPL-MCNC: 190 MG/DL (ref 0–199)
CO2 SERPL-SCNC: 28 MMOL/L (ref 21–32)
CREAT SERPL-MCNC: 1 MG/DL (ref 0.9–1.3)
GFR SERPLBLD CREATININE-BSD FMLA CKD-EPI: >90 ML/MIN/{1.73_M2}
GLUCOSE SERPL-MCNC: 89 MG/DL (ref 70–99)
HDLC SERPL-MCNC: 50 MG/DL (ref 40–60)
LDLC SERPL CALC-MCNC: 123 MG/DL
POTASSIUM SERPL-SCNC: 4.8 MMOL/L (ref 3.5–5.1)
PROT SERPL-MCNC: 7.2 G/DL (ref 6.4–8.2)
SODIUM SERPL-SCNC: 142 MMOL/L (ref 136–145)
TRIGL SERPL-MCNC: 86 MG/DL (ref 0–150)
VLDLC SERPL CALC-MCNC: 17 MG/DL

## 2025-01-10 PROCEDURE — 36415 COLL VENOUS BLD VENIPUNCTURE: CPT | Performed by: NURSE PRACTITIONER

## 2025-01-14 ENCOUNTER — HOSPITAL ENCOUNTER (OUTPATIENT)
Dept: PHYSICAL THERAPY | Age: 35
Setting detail: THERAPIES SERIES
Discharge: HOME | End: 2025-01-14
Payer: COMMERCIAL

## 2025-01-14 ENCOUNTER — APPOINTMENT (OUTPATIENT)
Dept: PHYSICAL THERAPY | Age: 35
End: 2025-01-14
Payer: COMMERCIAL

## 2025-01-14 PROCEDURE — 20560 NDL INSJ W/O NJX 1 OR 2 MUSC: CPT

## 2025-01-14 PROCEDURE — 97140 MANUAL THERAPY 1/> REGIONS: CPT

## 2025-01-14 PROCEDURE — 97110 THERAPEUTIC EXERCISES: CPT

## 2025-01-14 NOTE — FLOWSHEET NOTE
[facility]- Outpatient Rehabilitation and Therapy [address], [place] [address] office: [phone] fax: [phone]    Physical Therapy: TREATMENT/PROGRESS NOTE   Patient: Shady Huerta (35 y.o. male)   Examination Date: 2025   :  1990 MRN: [id number]   Visit #: 3   Insurance Allowable Auth Needed   BCBS check auth []Yes    []No    Insurance: [place] / Plan: [place] / Product Type: *No Product type* /   Insurance ID: [id number] - ([place] BCBS)  Secondary Insurance (if applicable):    Treatment Diagnosis:     ICD-10-CM    1. Cervical pain  M54.2       2. Chronic right shoulder pain  M25.511     G89.29       3. Weakness of right shoulder  R29.898          Medical Diagnosis:  Cervical spondylosis [M47.812]   Referring Physician: Luiz Monzon MD  PCP: Michelle Bocanegra APRN - CNP     Plan of care signed (Y/N):     Date of Patient follow up with Physician:      Progress Report/POC: EVAL today  Progress note due: 2025 (OR 10 visits /OR AUTH LIMITS, whichever is less)  POC update due: 2025                                              Medical History:  Comorbidities:  None  Relevant Medical History: bulging disc in neck                                         Precautions/ Contra-indications:           Latex allergy:  NO  Pacemaker:    NO  Contraindications for Manipulation: None    Preferred Language for Healthcare:   [x] English       [] other:    SUBJECTIVE EXAMINATION     Patient stated complaint: Pt reports did some cupping a couple days ago and felt good. Still feels weak when he plays volleyball as game progresses.       Neck pain into right shoulder, does not go down arm. Neck pain for 4 years had MRI showing bulging disc, had another last year showing no changes. Has had PT at [place] in the past, feels dry needling helped a lot. Is now having trouble lifting arm up overhead. Plays sand volleyball 1x a week, lifts

## 2025-01-15 ENCOUNTER — HOSPITAL ENCOUNTER (OUTPATIENT)
Dept: PHYSICAL THERAPY | Age: 35
Setting detail: THERAPIES SERIES
Discharge: HOME | End: 2025-01-15
Payer: COMMERCIAL

## 2025-01-15 PROCEDURE — 97140 MANUAL THERAPY 1/> REGIONS: CPT

## 2025-01-15 PROCEDURE — 20560 NDL INSJ W/O NJX 1 OR 2 MUSC: CPT

## 2025-01-15 PROCEDURE — 97110 THERAPEUTIC EXERCISES: CPT

## 2025-01-15 NOTE — FLOWSHEET NOTE
weeks  1. Independent in HEP and progression per patient tolerance, in order to prevent re-injury.   [] Progressing: [] Met: [] Not Met: [] Adjusted  2. Patient will have a decrease in pain to <0/10 to facilitate improvement in movement, function, and ADLs as indicated by Functional Deficits.  [] Progressing: [] Met: [] Not Met: [] Adjusted    Long Term Goals: To be achieved in: 12 weeks  1. Disability index score of 21% or less for the LEFS to assist with reaching prior level of function with activities such as dressing and hygiene ADLs.  [] Progressing: [] Met: [] Not Met: [] Adjusted  2. Patient will demonstrate increased AROM of left knee/ankle to functional IR/ER within 2 levels of opposite without pain to allow for proper joint functioning to enable patient to tuck shirt in.   [] Progressing: [] Met: [] Not Met: [] Adjusted  3. Patient will demonstrate increased Strength of quad/glutemed to at least 4+/5 throughout without pain to allow for proper functional mobility to enable patient to return to lifting routine at gym.   [] Progressing: [] Met: [] Not Met: [] Adjusted  4. Patient will return to full sand volleyball participation without increased symptoms or restriction to enable patient to resume PLOF and active lifestyle.   [] Progressing: [] Met: [] Not Met: [] Adjusted      Overall Progression Towards Functional goals/ Treatment Progress Update:  [] Patient is progressing as expected towards functional goals listed.    [] Progression is slowed due to complexities/Impairments listed.  [] Progression has been slowed due to co-morbidities.  [x] Plan just implemented, too soon (<30days) to assess goals progression   [] Goals require adjustment due to lack of progress  [] Patient is not progressing as expected and requires additional follow up with physician  [] Other:     TREATMENT PLAN     Frequency/Duration: 1-2x/week for  12  weeks for the following treatment interventions:    Interventions:  [x]

## 2025-01-21 ENCOUNTER — APPOINTMENT (OUTPATIENT)
Dept: PHYSICAL THERAPY | Age: 35
End: 2025-01-21
Payer: COMMERCIAL

## 2025-01-21 ENCOUNTER — HOSPITAL ENCOUNTER (OUTPATIENT)
Dept: PHYSICAL THERAPY | Age: 35
Setting detail: THERAPIES SERIES
Discharge: HOME | End: 2025-01-21
Payer: COMMERCIAL

## 2025-01-21 PROCEDURE — 97140 MANUAL THERAPY 1/> REGIONS: CPT

## 2025-01-21 PROCEDURE — 97110 THERAPEUTIC EXERCISES: CPT

## 2025-01-21 PROCEDURE — 20560 NDL INSJ W/O NJX 1 OR 2 MUSC: CPT

## 2025-01-21 NOTE — FLOWSHEET NOTE
muscle(s).  [] (56370) Needle insertion(s) without injection; 3 or more muscle(s)  [] (59337) ATTENDED ESTIM. Application of a modality to 1 or more areas; electrical stimulation (manual), each 15 minutes. Attended electrical stimulation requires direct (1-on-1) contact with the patient by the qualified professional/qualified personnel in providing electrical stimulation manually through the use of probes or other devices.  [] (13317) UNATTENDED ESTIM. Electrical stimulation (unattended), to 1 or more areas for indication(s) other than wound care, as part of a therapy plan of care. (\[facility]\"" )      GOALS     Patient stated goal: Pain free lifting/volleyball  [] Progressing: [] Met: [] Not Met: [] Adjusted    Therapist goals for Patient:   Short Term Goals: To be achieved in: 2 weeks  1. Independent in HEP and progression per patient tolerance, in order to prevent re-injury.   [] Progressing: [] Met: [] Not Met: [] Adjusted  2. Patient will have a decrease in pain to <0/10 to facilitate improvement in movement, function, and ADLs as indicated by Functional Deficits.  [] Progressing: [] Met: [] Not Met: [] Adjusted    Long Term Goals: To be achieved in: 12 weeks  1. Disability index score of 17% or less for the Neck Disability Index to assist with reaching prior level of function with activities such as dressing and hygiene ADLs.  [] Progressing: [] Met: [] Not Met: [] Adjusted  2. Patient will demonstrate increased AROM of shoulder to functional IR/ER within 2 levels of opposite without pain to allow for proper joint functioning to enable patient to tuck shirt in.   [] Progressing: [] Met: [] Not Met: [] Adjusted  3. Patient will demonstrate increased Strength of RTC/delt to at least 4+/5 throughout without pain to allow for proper functional mobility to enable patient to return to lifting routine at gym.   [] Progressing: [] Met: [] Not Met: [] Adjusted  4. Patient will return to full sand volleyball participation

## 2025-01-22 ENCOUNTER — APPOINTMENT (OUTPATIENT)
Dept: PHYSICAL THERAPY | Age: 35
End: 2025-01-22
Payer: COMMERCIAL

## 2025-01-23 ENCOUNTER — HOSPITAL ENCOUNTER (OUTPATIENT)
Dept: PHYSICAL THERAPY | Age: 35
Setting detail: THERAPIES SERIES
Discharge: HOME | End: 2025-01-23
Payer: COMMERCIAL

## 2025-01-23 PROCEDURE — 97140 MANUAL THERAPY 1/> REGIONS: CPT

## 2025-01-23 PROCEDURE — 97110 THERAPEUTIC EXERCISES: CPT

## 2025-01-23 PROCEDURE — 20560 NDL INSJ W/O NJX 1 OR 2 MUSC: CPT

## 2025-01-23 NOTE — FLOWSHEET NOTE
created electronically.  Refer to [employer] access code: [email or web address]    ASSESSMENT   Today's Assessment: During therapy this date, patient required visual cueing, verbal cueing, tactile cueing, muscle facilitation, modification of technique, progression of exercises and program, and manual interventions for exercise progression, improving proper muscle recruitment and activation/motor control patterns, modulating pain, promoting relaxation, increasing ROM, reduce/eliminate soft tissue swelling/inflammation/restriction, and improving soft tissue extensibility.Patient will continue to benefit from ongoing evaluation and advanced clinical decision from a Physical Therapist to address and improve pain control, ROM, muscle strength, neuromuscular control, and endurance to safely return to PLOF, ADLs/IADLs, work/work related tasks, recreational activity, and advanced sport activity without symptoms or restrictions. Tolerated needling with soreness. Able to perform more therex this session without issue. Glute med continues to show weakness deficits.     Medical Necessity Documentation:  I certify that this patient meets the below criteria necessary for medical necessity for care and/or justification of therapy services:  The patient has functional impairments and/or activity limitations and would benefit from continued outpatient therapy services to address the deficits outlined in the patients goals    Patient requires continued skilled intervention: [x] Yes  [] No      CHARGE CAPTURE     CPT Code (TIMED) minutes # CPT Code (UNTIMED) #     Therex (77435)  15 1  EVAL:LOW (42709 - Typically 20 minutes face-to-face)     Neuromusc. Re-ed (07756)    Re-Eval (26845)     Manual (00417) 10 1  VASO (25797)     Ther. Act (62151)    Estim Unattended (52567)     Gait (71132)    Dry Needle 1-2 muscle (20560) 1    Estim Attended (46145)    Dry Needle 3+ muscle (20561)     Iontophoresis (27822)    [facility]. Traction (32627)     Ultrasound

## 2025-01-28 ENCOUNTER — APPOINTMENT (OUTPATIENT)
Dept: PHYSICAL THERAPY | Age: 35
End: 2025-01-28
Payer: COMMERCIAL

## 2025-01-28 ENCOUNTER — HOSPITAL ENCOUNTER (OUTPATIENT)
Dept: PHYSICAL THERAPY | Age: 35
Setting detail: THERAPIES SERIES
End: 2025-01-28
Payer: COMMERCIAL

## 2025-01-29 ENCOUNTER — HOSPITAL ENCOUNTER (OUTPATIENT)
Dept: PHYSICAL THERAPY | Age: 35
Setting detail: THERAPIES SERIES
End: 2025-01-29
Payer: COMMERCIAL

## 2025-02-05 ENCOUNTER — HOSPITAL ENCOUNTER (OUTPATIENT)
Dept: PHYSICAL THERAPY | Age: 35
Setting detail: THERAPIES SERIES
Discharge: HOME | End: 2025-02-05
Payer: COMMERCIAL

## 2025-02-05 PROCEDURE — 97110 THERAPEUTIC EXERCISES: CPT

## 2025-02-05 PROCEDURE — 97140 MANUAL THERAPY 1/> REGIONS: CPT

## 2025-02-05 PROCEDURE — 20560 NDL INSJ W/O NJX 1 OR 2 MUSC: CPT

## 2025-02-05 NOTE — FLOWSHEET NOTE
[facility]- Outpatient Rehabilitation and Therapy [address], [place] [address] office: [phone] fax: [phone]    Physical Therapy: TREATMENT/PROGRESS NOTE   Patient: Shady Huerta (35 y.o. male)   Examination Date: 2025   :  1990 MRN: [id number]   Visit #: 5   Insurance Allowable Auth Needed   90 []Yes    [x]No    [place] / Product Type: *No Product type* /   Insurance ID: [id number] - ([place] BCBS)  Secondary Insurance (if applicable):      Treatment Diagnosis:    Acute pain of left knee  M25.562        Acute left ankle pain  M25.572        Generalized weakness  R53.1       Medical Diagnosis:  Acute pain of left knee  M25.562            Referring Physician: Pietro Link DO  PCP: Michelle Bocanegra APRN - CNP     Plan of care signed (Y/N):     Date of Patient follow up with Physician:      Progress Report/POC: NO  Progress note due: 2025 (OR 10 visits /OR AUTH LIMITS, whichever is less)  POC update due: 2025                                              Medical History:  Comorbidities:  None  Relevant Medical History: bulging disc in neck                                         Precautions/ Contra-indications:          Latex allergy:  NO  Pacemaker:    NO  Contraindications for Manipulation: None    Preferred Language for Healthcare:   [x] English       [] other:    SUBJECTIVE EXAMINATION     Patient stated complaint: Pt reports noticing improvement in LE symptoms, feeling a little stronger and not as overused.       Pt reports he ran about 1,000 miles last year, big uptick from 300ish the year before. Was going to do marathon but got injured a few months ago. Left leg around knee and into achillies, no calf/foot/ankle pain.        OBJECTIVE EXAMINATION      Test used Initial score  2025   Pain Summary VAS 0-7 1-2   Functional questionnaire LEFS 42%    ROM

## 2025-02-06 ENCOUNTER — HOSPITAL ENCOUNTER (OUTPATIENT)
Dept: PHYSICAL THERAPY | Age: 35
Setting detail: THERAPIES SERIES
Discharge: HOME | End: 2025-02-06
Payer: COMMERCIAL

## 2025-02-06 PROCEDURE — 20560 NDL INSJ W/O NJX 1 OR 2 MUSC: CPT

## 2025-02-06 PROCEDURE — 97110 THERAPEUTIC EXERCISES: CPT

## 2025-02-06 PROCEDURE — 97140 MANUAL THERAPY 1/> REGIONS: CPT

## 2025-02-06 NOTE — FLOWSHEET NOTE
[facility]- Outpatient Rehabilitation and Therapy [address], [place] [address] office: [phone] fax: [phone]    Physical Therapy: TREATMENT/PROGRESS NOTE   Patient: Shady Huerta (35 y.o. male)   Examination Date: 2025   :  1990 MRN: [id number]   Visit #: 5   Insurance Allowable Auth Needed   BCBS check auth []Yes    []No    [place] / [place] / Product Type: *No Product type* /   Insurance ID: [id number] - ([place] BCBS)  Secondary Insurance (if applicable):    Treatment Diagnosis:     ICD-10-CM    1. Cervical pain  M54.2       2. Chronic right shoulder pain  M25.511     G89.29       3. Weakness of right shoulder  R29.898          Medical Diagnosis:  Cervical spondylosis [M47.812]   Referring Physician: Luiz Monzon MD  PCP: Michelle Bocanegra APRN - CNP     Plan of care signed (Y/N):     Date of Patient follow up with Physician:      Progress Report/POC: EVAL today  Progress note due: 2025 (OR 10 visits /OR AUTH LIMITS, whichever is less)  POC update due: 2025                                              Medical History:  Comorbidities:  None  Relevant Medical History: bulging disc in neck                                         Precautions/ Contra-indications:           Latex allergy:  NO  Pacemaker:    NO  Contraindications for Manipulation: None    Preferred Language for Healthcare:   [x] English       [] other:    SUBJECTIVE EXAMINATION     Patient stated complaint: Pt reports pain centralizing to lateral aspect of shoulder.      Neck pain into right shoulder, does not go down arm. Neck pain for 4 years had MRI showing bulging disc, had another last year showing no changes. Has had PT at [place] in the past, feels dry needling helped a lot. Is now having trouble lifting arm up overhead. Plays sand volleyball 1x a week, lifts 3 times a weeks. Avoids anything overhead with his lifting, can't do

## 2025-02-12 ENCOUNTER — HOSPITAL ENCOUNTER (OUTPATIENT)
Dept: PHYSICAL THERAPY | Age: 35
Setting detail: THERAPIES SERIES
Discharge: HOME | End: 2025-02-12
Payer: COMMERCIAL

## 2025-02-12 PROCEDURE — 20560 NDL INSJ W/O NJX 1 OR 2 MUSC: CPT

## 2025-02-12 PROCEDURE — 97110 THERAPEUTIC EXERCISES: CPT

## 2025-02-12 PROCEDURE — 97140 MANUAL THERAPY 1/> REGIONS: CPT

## 2025-02-12 NOTE — PROGRESS NOTES
[facility]- Outpatient Rehabilitation and Therapy [address], [place] [address] office: [phone] fax: [phone]    Physical Therapy: TREATMENT/PROGRESS NOTE   Patient: Shady Huerta (35 y.o. male)   Examination Date: 2025   :  1990 MRN: [id number]   Visit #: 6   Insurance Allowable Auth Needed   90 []Yes    [x]No    [place] / Product Type: *No Product type* /   Insurance ID: [id number] - ([place] BCBS)  Secondary Insurance (if applicable):      Treatment Diagnosis:    Acute pain of left knee  M25.562        Acute left ankle pain  M25.572        Generalized weakness  R53.1       Medical Diagnosis:  Acute pain of left knee  M25.562            Referring Physician: Pietro Link DO  PCP: Michelle Bocanegra APRN - CNP     Plan of care signed (Y/N):     Date of Patient follow up with Physician:      Progress Report/POC: YES, Date Range for this report: 1/3/2025 to 2025  Progress note due: 3/12/2025 (OR 10 visits /OR AUTH LIMITS, whichever is less)  POC update due: 2025                                              Medical History:  Comorbidities:  None  Relevant Medical History: bulging disc in neck                                         Precautions/ Contra-indications:          Latex allergy:  NO  Pacemaker:    NO  Contraindications for Manipulation: None    Preferred Language for Healthcare:   [x] English       [] other:    SUBJECTIVE EXAMINATION     Patient stated complaint: Pt reports leg feeling like it is definietely getting better, needling quad last time helped a lot.      Pt reports he ran about 1,000 miles last year, big uptick from 300ish the year before. Was going to do marathon but got injured a few months ago. Left leg around knee and into achillies, no calf/foot/ankle pain.        OBJECTIVE EXAMINATION      Test used Initial score  2025   Pain Summary VAS 0-7 0

## 2025-02-13 ENCOUNTER — HOSPITAL ENCOUNTER (OUTPATIENT)
Dept: PHYSICAL THERAPY | Age: 35
Setting detail: THERAPIES SERIES
Discharge: HOME | End: 2025-02-13
Payer: COMMERCIAL

## 2025-02-13 PROCEDURE — 97140 MANUAL THERAPY 1/> REGIONS: CPT

## 2025-02-13 PROCEDURE — 97110 THERAPEUTIC EXERCISES: CPT

## 2025-02-13 PROCEDURE — 20560 NDL INSJ W/O NJX 1 OR 2 MUSC: CPT

## 2025-02-13 NOTE — FLOWSHEET NOTE
needling but nothing that lasts more than a day or two. Typically does okay once arm is warmed up but hurts a lot when cold.      Medical Necessity Documentation:  I certify that this patient meets the below criteria necessary for medical necessity for care and/or justification of therapy services:  The patient has functional impairments and/or activity limitations and would benefit from continued outpatient therapy services to address the deficits outlined in the patients goals    Patient requires continued skilled intervention: [x] Yes  [] No      CHARGE CAPTURE     CPT Code (TIMED) minutes # CPT Code (UNTIMED) #     Therex (79008)  10 1  EVAL:LOW (29208 - Typically 20 minutes face-to-face)     Neuromusc. Re-ed (49026) 5   Re-Eval (09023)     Manual (92062) 10 1  VASO (99698)     Ther. Act (97359)    Estim Unattended (65381)     Gait (72721)    Dry Needle 1-2 muscle (85892) 1    Estim Attended (08688)    Dry Needle 3+ muscle (20561)     Iontophoresis (85913)    [facility]. Traction (37227)     Ultrasound (14176)    Group Therapy (21892)     Other:    Other:    Total Timed Code Tx Minutes 25 2  1     Total Treatment Minutes 35       Charge Justification:  [x] (76961) THERAPEUTIC EXERCISE - Provided verbal/tactile cueing for activities related to strengthening, flexibility, endurance, ROM performed to prevent loss of range of motion, maintain or improve muscular strength or increase flexibility, following either an injury or surgery.   [x] (12352) NEUROMUSCULAR RE-EDUCATION - Therapeutic procedure, 1 or more areas, each 15 minutes; neuromuscular reeducation of movement, balance, coordination, kinesthetic sense, posture, and/or proprioception for sitting and/or standing activities  [x] (27009) MANUAL THERAPY -  Manual therapy techniques, 1 or more regions, each 15 minutes (Mobilization/manipulation, manual lymphatic drainage, manual traction) for the purpose of modulating pain, promoting relaxation,  increasing ROM,

## 2025-02-17 ENCOUNTER — TELEPHONE (OUTPATIENT)
Dept: ORTHOPEDIC SURGERY | Age: 35
End: 2025-02-17

## 2025-02-17 ENCOUNTER — OFFICE VISIT (OUTPATIENT)
Dept: ORTHOPEDIC SURGERY | Age: 35
End: 2025-02-17
Payer: COMMERCIAL

## 2025-02-17 VITALS — HEIGHT: 72 IN | WEIGHT: 185 LBS | BODY MASS INDEX: 25.06 KG/M2

## 2025-02-17 DIAGNOSIS — M75.51 SUBACROMIAL BURSITIS OF RIGHT SHOULDER JOINT: ICD-10-CM

## 2025-02-17 DIAGNOSIS — M75.81 ROTATOR CUFF TENDONITIS, RIGHT: Primary | ICD-10-CM

## 2025-02-17 PROCEDURE — 99213 OFFICE O/P EST LOW 20 MIN: CPT | Performed by: ORTHOPAEDIC SURGERY

## 2025-02-17 NOTE — TELEPHONE ENCOUNTER
Attempted to call Pt to determine where he would be wanting to go for MRI, as there may be no in network options in the area. Did not answer, so LVM to call me back at [phone] to determine how he would like to proceed.     Once I get this answer, I can relay to Yanelis in pre-cert.

## 2025-02-17 NOTE — TELEPHONE ENCOUNTER
Pt called back, and relayed to him that per Yanelis in pre-cert there were only 2 locations that showed up as in network for his plan: both [facility]. 1 of those 2 locations is showing as closed, and the other location appears to be a lab and not an imaging facility.     Confirmed insurance information with him over the phone. He would rather proceed with an in-network facility. Let him know I would reach back out to pre-cert and let him know if I find out any other information. He is understanding of this.

## 2025-02-17 NOTE — PROGRESS NOTES
FOLLOW UP ORTHOPAEDIC NOTE    The patient follows up today for reevaluation of right shoulder.  Patient states 100% symptom relief for roughly 3 to 4 months and slowly started to decrease throughout the holidays in terms of his pain relief.  Previous Mobic prescription and physician directed/physical therapy has also not alleviated the patient's pain with return of lateral and slight anterior lateral based shoulder symptoms.. The patient states 5/10 pain.  Patient states limitations with ADLs    PE:  AAOx3  RR  Unlabored breathing  Skin warm and moist  Focused physical examination of the right shoulder  Positive Neer positive Hui 5/5 rotator cuff testing throughout albeit with pain with supraspinatus testing  Remainder of range of motion and neurovascular exam unchanged     Diagnosis Orders   1. Rotator cuff tendonitis, right  MRI SHOULDER RIGHT WO CONTRAST      2. Subacromial bursitis of right shoulder joint  MRI SHOULDER RIGHT WO CONTRAST        Assessment and plan: 35 male with continued subjective symptoms of right shoulder pain with known, correlating diagnosis of right shoulder recalcitrant subacromial bursitis/rotator cuff tendinitis.  -Time of 12 minutes was spent coordinating and discussing the clinical findings and diagnostic imaging results as they pertain to the patient's presenting subjective symptoms.  -I had a pleasant discussion with the patient today.  I reviewed with him consideration for continued conservative care however did advocate for advanced imaging at this time.  MRI has been ordered and he will follow-up thereafter  -OTC Tylenol/Aleve per bottle PRN discomfort  -Patient has been going to physical therapy for his neck as well as the home program I taught him.  He will hold on any formal physical therapy visits at this time  -Activity modifications to include low impact  -All questions answered to the patient's satisfaction and the patient expressed understanding and agreement with the  Last ordered 6/8/2020

## 2025-02-18 ENCOUNTER — TELEPHONE (OUTPATIENT)
Dept: ORTHOPEDIC SURGERY | Age: 35
End: 2025-02-18

## 2025-02-18 NOTE — TELEPHONE ENCOUNTER
Spoke with the Pt and let him know that those same 2 locations were still the only locations showing up when pre-cert re-ran his insurance information. Recommended he call his insurance and see if they had any other in network options for him that we were not finding for some reason. He will call back with what he finds out, and I let him know I would call him if I heard anything else.

## 2025-02-19 ENCOUNTER — TELEPHONE (OUTPATIENT)
Dept: ORTHOPEDIC SURGERY | Age: 35
End: 2025-02-19

## 2025-02-19 ENCOUNTER — APPOINTMENT (OUTPATIENT)
Dept: PHYSICAL THERAPY | Age: 35
End: 2025-02-19
Payer: COMMERCIAL

## 2025-02-20 ENCOUNTER — TELEPHONE (OUTPATIENT)
Dept: ORTHOPEDIC SURGERY | Age: 35
End: 2025-02-20

## 2025-02-20 ENCOUNTER — APPOINTMENT (OUTPATIENT)
Dept: PHYSICAL THERAPY | Age: 35
End: 2025-02-20
Payer: COMMERCIAL

## 2025-02-20 NOTE — TELEPHONE ENCOUNTER
Called patient to let them know that their MRI has been authorized. Also told them that they can call [phone] and schedule a f/u with Dr. Weinstein once they have MRI scheduled, leaving at least 2-3 days for our office to receive their results.

## 2025-02-26 ENCOUNTER — APPOINTMENT (OUTPATIENT)
Dept: PHYSICAL THERAPY | Age: 35
End: 2025-02-26
Payer: COMMERCIAL

## 2025-02-27 ENCOUNTER — APPOINTMENT (OUTPATIENT)
Dept: PHYSICAL THERAPY | Age: 35
End: 2025-02-27
Payer: COMMERCIAL

## 2025-03-07 ENCOUNTER — OFFICE VISIT (OUTPATIENT)
Dept: ORTHOPEDIC SURGERY | Age: 35
End: 2025-03-07

## 2025-03-07 ENCOUNTER — TELEPHONE (OUTPATIENT)
Dept: ORTHOPEDIC SURGERY | Age: 35
End: 2025-03-07

## 2025-03-07 ENCOUNTER — OFFICE VISIT (OUTPATIENT)
Dept: FAMILY MEDICINE CLINIC | Age: 35
End: 2025-03-07

## 2025-03-07 VITALS
SYSTOLIC BLOOD PRESSURE: 112 MMHG | HEIGHT: 72 IN | HEART RATE: 51 BPM | WEIGHT: 179.6 LBS | DIASTOLIC BLOOD PRESSURE: 68 MMHG | BODY MASS INDEX: 24.33 KG/M2 | OXYGEN SATURATION: 97 %

## 2025-03-07 VITALS — HEIGHT: 72 IN | WEIGHT: 179 LBS | BODY MASS INDEX: 24.24 KG/M2

## 2025-03-07 DIAGNOSIS — M75.81 ROTATOR CUFF TENDONITIS, RIGHT: ICD-10-CM

## 2025-03-07 DIAGNOSIS — M75.21 RIGHT BICIPITAL TENOSYNOVITIS: ICD-10-CM

## 2025-03-07 DIAGNOSIS — M75.51 SUBACROMIAL BURSITIS OF RIGHT SHOULDER JOINT: Primary | ICD-10-CM

## 2025-03-07 DIAGNOSIS — Z00.00 PREVENTATIVE HEALTH CARE: ICD-10-CM

## 2025-03-07 DIAGNOSIS — Z00.00 ANNUAL WELLNESS VISIT: Primary | ICD-10-CM

## 2025-03-07 LAB
ALBUMIN SERPL-MCNC: 4.7 G/DL (ref 3.4–5)
ALBUMIN/GLOB SERPL: 2 {RATIO} (ref 1.1–2.2)
ALP SERPL-CCNC: 51 U/L (ref 40–129)
ALT SERPL-CCNC: 28 U/L (ref 10–40)
ANION GAP SERPL CALCULATED.3IONS-SCNC: 10 MMOL/L (ref 3–16)
AST SERPL-CCNC: 25 U/L (ref 15–37)
BILIRUB SERPL-MCNC: 0.5 MG/DL (ref 0–1)
BUN SERPL-MCNC: 25 MG/DL (ref 7–20)
CALCIUM SERPL-MCNC: 9.7 MG/DL (ref 8.3–10.6)
CHLORIDE SERPL-SCNC: 103 MMOL/L (ref 99–110)
CO2 SERPL-SCNC: 28 MMOL/L (ref 21–32)
CREAT SERPL-MCNC: 1.1 MG/DL (ref 0.9–1.3)
GFR SERPLBLD CREATININE-BSD FMLA CKD-EPI: 89 ML/MIN/{1.73_M2}
GLUCOSE SERPL-MCNC: 99 MG/DL (ref 70–99)
POTASSIUM SERPL-SCNC: 4.9 MMOL/L (ref 3.5–5.1)
PROT SERPL-MCNC: 7 G/DL (ref 6.4–8.2)
SODIUM SERPL-SCNC: 141 MMOL/L (ref 136–145)

## 2025-03-07 RX ORDER — MELOXICAM 15 MG/1
15 TABLET ORAL DAILY PRN
Qty: 30 TABLET | Refills: 0 | Status: SHIPPED | OUTPATIENT
Start: 2025-03-07

## 2025-03-07 ASSESSMENT — ENCOUNTER SYMPTOMS
DIARRHEA: 0
COLOR CHANGE: 0
NAUSEA: 0
SHORTNESS OF BREATH: 0
SORE THROAT: 0
VOMITING: 0

## 2025-03-07 NOTE — TELEPHONE ENCOUNTER
Pt called with questions about surgery recovery timeline. He has a scheduled trip to [place] July 13-9th, and is hoping to be able to be restriction free at that point. Wanted to know if that was possible with doing surgery around 2nd week of April. If not, he would like to hold off on surgery until after this trip. Let him know I would discuss with Dr. Weinstein and get back to him.

## 2025-03-07 NOTE — PROGRESS NOTES
PROGRESS NOTE  Date of Service:  3/7/2025    SUBJECTIVE:  Patient ID: Shady Huerta is a 35 y.o. male    HPI: annual well visit for annual   No sob no cp  Mp n/v/d  Bm daily no blood in stool  No risky behaviors  Sleep rested in am  Exercise- 6-7 days cardio and wts  No skin concerns  No safety concerns  No anxiety concerns   Knee and shoulder pain MRI followed by orthopedics   No cervical pain at this time treatment with ablation was beneficial   Vision exam lasik no need for visual correction   Dental UTD   No concern for hearing loss   No concerns for hearing with headset.    Past Medical History:   Diagnosis Date    Anxiety 2021    Had a massive anxiety attack on a plane in May 2023 where i had to be given oxygen and went to the ER upon landing    Headache 2021    Work in front of computer all day.      Past Surgical History:   Procedure Laterality Date    WISDOM TOOTH EXTRACTION      WRIST SURGERY      3 different surgerys       Social History     Tobacco Use    Smoking status: Former    Smokeless tobacco: Former   Substance Use Topics    Alcohol use: Not Currently     Comment: few times a week      Family History   Problem Relation Age of Onset    High Blood Pressure Mother     High Blood Pressure Father     Heart Disease Father      Current Outpatient Medications on File Prior to Visit   Medication Sig Dispense Refill    meloxicam (MOBIC) 15 MG tablet Take 1 tablet by mouth daily as needed for Pain 30 tablet 0     No current facility-administered medications on file prior to visit.       Allergies   Allergen Reactions    Erythromycin         Review of Systems   Constitutional:  Negative for fatigue, fever and unexpected weight change.   HENT:  Positive for congestion. Negative for sore throat.    Respiratory:  Negative for shortness of breath.    Cardiovascular:  Negative for chest pain, palpitations and leg swelling.   Gastrointestinal:  Negative for diarrhea, nausea and vomiting.

## 2025-03-07 NOTE — TELEPHONE ENCOUNTER
Called Pt back to let him know Dr. Weinstein recommends holding off on surgery until after his July vacation. Will reach back out beginning of June to schedule him, if he has not called me before then.    Have him scheduled for follow up next week to discuss possible repeat steroid injection to try to get him through until July.

## 2025-03-07 NOTE — PROGRESS NOTES
Positive acromioclavicular joint edema/arthrosis     Diagnosis Orders   1. Subacromial bursitis of right shoulder joint  Breg Sling Shot Shoulder Sling    meloxicam (MOBIC) 15 MG tablet      2. Rotator cuff tendonitis, right  Breg Sling Shot Shoulder Sling    meloxicam (MOBIC) 15 MG tablet      3. Right bicipital tenosynovitis  Breg Sling Shot Shoulder Sling    meloxicam (MOBIC) 15 MG tablet        Assessment and plan: 35 male with continued subjective symptoms of right shoulder pain with known, correlating diagnosis of right shoulder recalcitrant subacromial bursitis/rotator cuff tendinitis/partial tear and bicipital tenosynovitis.  -Time of 16 minutes was spent coordinating and discussing the clinical findings and diagnostic imaging results as they pertain to the patient's presenting subjective symptoms.  -I had a pleasant discussion with the patient today and reviewed with him directly his advanced imaging findings which do correlate to his examination.  I reviewed with him consideration for conservative care with additional corticosteroid injections versus surgical treatment options and given the patient's duration of symptoms for greater than 3 to 5 years and with having failed conservative care he wishes to proceed with surgical intervention of right shoulder arthroscopic subacromial decompression, possible rotator cuff repair, and open subpectoral biceps tenodesis.  The patient understands the perioperative course and what will entail  --Currently the patient wishes to proceed with surgery given right shoulder symptoms from right shoulder injury as assessed by clinical exam and diagnostic imaging, which correlates to subjective symptoms.  Understanding the risks and benefits of nonoperative versus operative treatments, he wishes to pursue surgery.    Benefits of decreased pain and improved function were discussed with the patient as well as potential risks which included but were not limited to damage to

## 2025-03-08 ENCOUNTER — RESULTS FOLLOW-UP (OUTPATIENT)
Dept: FAMILY MEDICINE CLINIC | Age: 35
End: 2025-03-08

## 2025-03-13 ENCOUNTER — OFFICE VISIT (OUTPATIENT)
Dept: ORTHOPEDIC SURGERY | Age: 35
End: 2025-03-13

## 2025-03-13 VITALS — BODY MASS INDEX: 24.24 KG/M2 | HEIGHT: 72 IN | WEIGHT: 179 LBS

## 2025-03-13 DIAGNOSIS — M75.81 ROTATOR CUFF TENDONITIS, RIGHT: ICD-10-CM

## 2025-03-13 DIAGNOSIS — M75.21 RIGHT BICIPITAL TENOSYNOVITIS: ICD-10-CM

## 2025-03-13 DIAGNOSIS — M75.51 SUBACROMIAL BURSITIS OF RIGHT SHOULDER JOINT: Primary | ICD-10-CM

## 2025-03-13 RX ORDER — ROPIVACAINE HYDROCHLORIDE 5 MG/ML
4 INJECTION, SOLUTION EPIDURAL; INFILTRATION; PERINEURAL ONCE
Status: COMPLETED | OUTPATIENT
Start: 2025-03-13 | End: 2025-03-13

## 2025-03-13 RX ORDER — TRIAMCINOLONE ACETONIDE 40 MG/ML
40 INJECTION, SUSPENSION INTRA-ARTICULAR; INTRAMUSCULAR ONCE
Status: COMPLETED | OUTPATIENT
Start: 2025-03-13 | End: 2025-03-13

## 2025-03-13 RX ADMIN — ROPIVACAINE HYDROCHLORIDE 4 ML: 5 INJECTION, SOLUTION EPIDURAL; INFILTRATION; PERINEURAL at 08:32

## 2025-03-13 RX ADMIN — TRIAMCINOLONE ACETONIDE 40 MG: 40 INJECTION, SUSPENSION INTRA-ARTICULAR; INTRAMUSCULAR at 08:33

## 2025-03-13 NOTE — PROGRESS NOTES
FOLLOW UP ORTHOPAEDIC NOTE    The patient follows up today for reevaluation of right shoulder. The patient states 7/10 pain.  Previous discussions we reviewed working toward surgery for definitive management however he has a trip upcoming and July that he needs to be completely ready for.  While I reviewed with him likelihood of still being able to proceed he wishes and is most comfortable to postpone surgery and wishes for an additional corticosteroid injection    PE:  AAOx3  RR  Unlabored breathing  Skin warm and moist  Focused physical examination of the right shoulder  Positive Neer positive Hui, positive tenderness palpation biceps tendon.  Neurovascular exam unchanged     Diagnosis Orders   1. Subacromial bursitis of right shoulder joint  20605 - [place] DRAIN/INJECT INTERMEDIATE JOINT/BURSA    triamcinolone acetonide (KENALOG-40) injection 40 mg    ROPivacaine (NAROPIN) 0.5% injection 4 mL      2. Rotator cuff tendonitis, right  20605 - [place] DRAIN/INJECT INTERMEDIATE JOINT/BURSA    triamcinolone acetonide (KENALOG-40) injection 40 mg    ROPivacaine (NAROPIN) 0.5% injection 4 mL      3. Right bicipital tenosynovitis          Assessment and plan: 35 male with continued subjective symptoms of right shoulder pain with known, correlating diagnosis of right shoulder subacromial bursitis/rotator cuff tendinitis and bicipital tenosynovitis.  -Time of 12 I had a pleasant discussion with the patient today.  Minutes was spent coordinating and discussing the clinical findings and diagnostic imaging results as they pertain to the patient's presenting subjective symptoms.  -I had a pleasant discussion with the patient today.  I reviewed with the patient consideration for corticosteroid injection in the subacromial space given the great excellent extremity from the previous.  He wishes to pursue this.  Understand the risk and benefits of a subacromial space injection was agreed upon  --The patient was educated to the risks

## 2025-06-11 ENCOUNTER — TELEPHONE (OUTPATIENT)
Dept: ORTHOPEDIC SURGERY | Age: 35
End: 2025-06-11

## 2025-06-11 NOTE — TELEPHONE ENCOUNTER
Spoke with Pt to discuss possible surgery scheduling. He now may not be able to proceed with surgery until September or later. Would like to discuss with Dr. Weinstein. Got him scheduled for follow up this Friday (6/13/2025). He will call if he needs anything else before then.    Tranexamic Acid Pregnancy And Lactation Text: It is unknown if this medication is safe during pregnancy or breast feeding. Skin Substitute Text: The defect edges were debeveled with a #15 scalpel blade.  Given the location of the defect, shape of the defect and the proximity to free margins a skin substitute graft was deemed most appropriate.  The graft material was trimmed to fit the size of the defect. The graft was then placed in the primary defect and oriented appropriately.

## 2025-06-13 ENCOUNTER — OFFICE VISIT (OUTPATIENT)
Dept: ORTHOPEDIC SURGERY | Age: 35
End: 2025-06-13

## 2025-06-13 VITALS — BODY MASS INDEX: 24.24 KG/M2 | HEIGHT: 72 IN | WEIGHT: 179 LBS

## 2025-06-13 DIAGNOSIS — M75.51 SUBACROMIAL BURSITIS OF RIGHT SHOULDER JOINT: ICD-10-CM

## 2025-06-13 DIAGNOSIS — M75.21 RIGHT BICIPITAL TENOSYNOVITIS: Primary | ICD-10-CM

## 2025-06-13 RX ORDER — MELOXICAM 15 MG/1
15 TABLET ORAL DAILY PRN
Qty: 30 TABLET | Refills: 0 | Status: SHIPPED | OUTPATIENT
Start: 2025-06-13

## 2025-06-13 RX ORDER — ROPIVACAINE HYDROCHLORIDE 5 MG/ML
2 INJECTION, SOLUTION EPIDURAL; INFILTRATION; PERINEURAL ONCE
Status: COMPLETED | OUTPATIENT
Start: 2025-06-13 | End: 2025-06-13

## 2025-06-13 RX ORDER — TRIAMCINOLONE ACETONIDE 40 MG/ML
40 INJECTION, SUSPENSION INTRA-ARTICULAR; INTRAMUSCULAR ONCE
Status: COMPLETED | OUTPATIENT
Start: 2025-06-13 | End: 2025-06-13

## 2025-06-13 RX ADMIN — TRIAMCINOLONE ACETONIDE 40 MG: 40 INJECTION, SUSPENSION INTRA-ARTICULAR; INTRAMUSCULAR at 11:57

## 2025-06-13 RX ADMIN — ROPIVACAINE HYDROCHLORIDE 2 ML: 5 INJECTION, SOLUTION EPIDURAL; INFILTRATION; PERINEURAL at 11:57

## 2025-06-13 NOTE — PROGRESS NOTES
blanching to name a few) and benefits of the injection and understanding these risks and benefits, the patient wished to proceed and a verbal consent was obtained.  If the patient verbalized the presence of diabetes or rheumatologic condition on chronic immunosuppresseants as a comorbidity upon direct questioning, an additional discussion was had detailing the potential increased risk of infection and potential increase in FSBG and to monitor FSBG and adjust medications as needed.  -After sterile prep of the right shoulder, a 2cc corticosteroid injection (2cc ropivicaine and 1cc kenolog 40) was administered into the right shoulder biceps tendon groove area.  The patient tolerated the procedure well and started to have immediate improvement in pain/symptoms.  -Continue activity modification as previously reviewed and discussed  -OTC Tylenol and Mobic 15 mg p.o. daily as needed pain  -All questions answered to the patient's satisfaction and the patient expressed understanding and agreement with the above listed treatment plan  -Follow up in 10 to 12 days postop or sooner should symptoms require  -Thank you for the clinical consultation and allowing me to participate in the patient's care.      Electronically signed by Harrison Weinstein MD on 6/13/25 at 12:36 PM EDT         Harrison Weinstein MD       Orthopaedic Surgery-Sports Medicine    Disclaimer:  This note was dictated with voice recognition software.  Though review and correction are routinely performed, please contact the office/medical records for any errors requiring correction.

## 2025-06-20 ENCOUNTER — TELEPHONE (OUTPATIENT)
Dept: ORTHOPEDIC SURGERY | Age: 35
End: 2025-06-20

## 2025-06-20 DIAGNOSIS — F40.243 FEAR OF FLYING: Primary | ICD-10-CM

## 2025-06-20 RX ORDER — LORAZEPAM 0.5 MG/1
0.5 TABLET ORAL 3 TIMES DAILY PRN
Qty: 6 TABLET | Refills: 0 | Status: SHIPPED | OUTPATIENT
Start: 2025-06-20 | End: 2025-06-30

## 2025-06-20 NOTE — TELEPHONE ENCOUNTER
Pt called and picked surgery date (10/1/2025)- providing him with location, estimated surgery time, and estimated arrival time. Scheduled him for first post op appointment- providing him with date, time, and location. Reviewed surgery folder forms. Pt does request that I send dates and times to him via [employer] message as well. Pt requests that I do let him know once I confirm that everything will be in network with his insurance company. Let him know I would reach out once I get confirmation.    He will reach back out if he needs anything else from us.

## 2025-06-25 ENCOUNTER — PREP FOR PROCEDURE (OUTPATIENT)
Dept: ORTHOPEDIC SURGERY | Age: 35
End: 2025-06-25

## 2025-06-25 DIAGNOSIS — M75.51 SUBACROMIAL BURSITIS OF RIGHT SHOULDER JOINT: ICD-10-CM

## 2025-06-25 DIAGNOSIS — M75.21 RIGHT BICIPITAL TENOSYNOVITIS: ICD-10-CM

## 2025-06-25 DIAGNOSIS — M75.81 ROTATOR CUFF TENDONITIS, RIGHT: ICD-10-CM

## 2025-06-25 RX ORDER — ACETAMINOPHEN 325 MG/1
1000 TABLET ORAL ONCE
OUTPATIENT
Start: 2025-06-25 | End: 2025-06-25

## 2025-06-25 RX ORDER — TRANEXAMIC ACID 100 MG/ML
1000 INJECTION, SOLUTION INTRAVENOUS ONCE
OUTPATIENT
Start: 2025-06-25 | End: 2025-06-25

## 2025-06-25 RX ORDER — MELOXICAM 7.5 MG/1
7.5 TABLET ORAL ONCE
OUTPATIENT
Start: 2025-06-25 | End: 2025-06-25